# Patient Record
Sex: MALE | Race: WHITE | HISPANIC OR LATINO | Employment: FULL TIME | URBAN - METROPOLITAN AREA
[De-identification: names, ages, dates, MRNs, and addresses within clinical notes are randomized per-mention and may not be internally consistent; named-entity substitution may affect disease eponyms.]

---

## 2018-08-15 ENCOUNTER — OFFICE VISIT (OUTPATIENT)
Dept: URGENT CARE | Facility: PHYSICIAN GROUP | Age: 28
End: 2018-08-15
Payer: COMMERCIAL

## 2018-08-15 VITALS
RESPIRATION RATE: 16 BRPM | BODY MASS INDEX: 34.53 KG/M2 | OXYGEN SATURATION: 94 % | DIASTOLIC BLOOD PRESSURE: 86 MMHG | WEIGHT: 220 LBS | HEIGHT: 67 IN | TEMPERATURE: 99.3 F | HEART RATE: 90 BPM | SYSTOLIC BLOOD PRESSURE: 130 MMHG

## 2018-08-15 DIAGNOSIS — J40 BRONCHITIS: ICD-10-CM

## 2018-08-15 PROCEDURE — 99214 OFFICE O/P EST MOD 30 MIN: CPT | Performed by: PHYSICIAN ASSISTANT

## 2018-08-15 RX ORDER — ALBUTEROL SULFATE 2.5 MG/3ML
2.5 SOLUTION RESPIRATORY (INHALATION) ONCE
Status: COMPLETED | OUTPATIENT
Start: 2018-08-15 | End: 2018-08-15

## 2018-08-15 RX ORDER — DEXTROMETHORPHAN HYDROBROMIDE AND PROMETHAZINE HYDROCHLORIDE 15; 6.25 MG/5ML; MG/5ML
SYRUP ORAL
Qty: 180 ML | Refills: 0 | Status: SHIPPED | OUTPATIENT
Start: 2018-08-15 | End: 2018-10-19

## 2018-08-15 RX ORDER — PREDNISONE 20 MG/1
TABLET ORAL
Qty: 10 TAB | Refills: 0 | Status: SHIPPED | OUTPATIENT
Start: 2018-08-15 | End: 2018-10-19

## 2018-08-15 RX ORDER — DOXYCYCLINE HYCLATE 100 MG
100 TABLET ORAL 2 TIMES DAILY
Qty: 14 TAB | Refills: 0 | Status: SHIPPED | OUTPATIENT
Start: 2018-08-15 | End: 2018-08-22

## 2018-08-15 RX ADMIN — ALBUTEROL SULFATE 2.5 MG: 2.5 SOLUTION RESPIRATORY (INHALATION) at 15:48

## 2018-08-15 ASSESSMENT — ENCOUNTER SYMPTOMS
WEIGHT LOSS: 0
WHEEZING: 0
COUGH: 1
HEMOPTYSIS: 0
SORE THROAT: 0
CHILLS: 0
RHINORRHEA: 1
HEARTBURN: 0
HEADACHES: 0
FEVER: 0
SHORTNESS OF BREATH: 1

## 2018-08-15 ASSESSMENT — COPD QUESTIONNAIRES: COPD: 0

## 2018-08-15 NOTE — PROGRESS NOTES
"Subjective:      Jevon Carbajal is a 27 y.o. male who presents with Cough (x 6 weeks)            Cough   This is a new problem. The current episode started more than 1 month ago. The problem has been gradually worsening. The problem occurs every few minutes. The cough is productive of sputum. Associated symptoms include nasal congestion, postnasal drip, rhinorrhea and shortness of breath. Pertinent negatives include no chest pain, chills, ear congestion, ear pain, fever, headaches, heartburn, hemoptysis, sore throat, weight loss or wheezing. The symptoms are aggravated by lying down. He has tried OTC cough suppressant for the symptoms. The treatment provided no relief. There is no history of asthma or COPD.     Past medical history: Is not pertinent to this examination  Past surgical history: Not pertinent to this examination  Family history: Is not pertinent to this examination  Allergies: No known drug allergies  Social history: Is reviewed in Epic      Review of Systems   Constitutional: Negative for chills, fever and weight loss.   HENT: Positive for postnasal drip and rhinorrhea. Negative for ear pain and sore throat.    Respiratory: Positive for cough and shortness of breath. Negative for hemoptysis and wheezing.    Cardiovascular: Negative for chest pain.   Gastrointestinal: Negative for heartburn.   Neurological: Negative for headaches.          Objective:     /86   Pulse 90   Temp 37.4 °C (99.3 °F)   Resp 16   Ht 1.702 m (5' 7\")   Wt 99.8 kg (220 lb)   SpO2 94%   BMI 34.46 kg/m²      Physical Exam       Gen.: Patient is A and O ×3, no acute distress, well-nourished well-hydrated  Vitals: Are listed and unremarkable  HEENT: Heads normocephalic, atraumatic, PERRLA, extraocular movements intact, TMs and oropharynx clear  Neck: Soft supple without cervical lymphadenopathy  Cardiovascular: Regular rate and rhythm normal S1 and S2. No murmurs, rubs or gallops  Lungs decreased breath sounds bilaterally. " No wheezes rales or rhonchi heard  Abdomen is soft, nontender, nondistended with good bowel sounds, no hepatosplenomegaly  Skin: Is well perfused without evidence of rash or lesions  Neurological:  cranial nerves II through XII were assessed and intact.  Musculoskeletal: Full range of motion, 5 out of 5 strength against resistance  Neurovascularly: Intact with a 2 second cap refill, good distal pulses    Urgent care course: Albuterol breathing treatment ×1 given. Patient may auscultated had much improved breath sounds that were clear  Assessment/Plan:     1. Bronchitis  Given duration of symptoms, I will put him on an antibiotic. Please follow-up with us if symptoms persist or worsen despite treatment  - doxycycline (VIBRAMYCIN) 100 MG Tab; Take 1 Tab by mouth 2 times a day for 7 days.  Dispense: 14 Tab; Refill: 0  - albuterol (PROVENTIL) 2.5mg/3ml nebulizer solution 2.5 mg; 3 mL by Nebulization route Once.  - predniSONE (DELTASONE) 20 MG Tab; Take one pill twice a day for five days  Dispense: 10 Tab; Refill: 0  - promethazine-dextromethorphan (PROMETHAZINE-DM) 6.25-15 MG/5ML syrup; Take 5mls every six hours as needed for cough  Dispense: 180 mL; Refill: 0

## 2018-10-19 ENCOUNTER — HOSPITAL ENCOUNTER (OUTPATIENT)
Facility: MEDICAL CENTER | Age: 28
End: 2018-10-19
Attending: NURSE PRACTITIONER
Payer: COMMERCIAL

## 2018-10-19 ENCOUNTER — OFFICE VISIT (OUTPATIENT)
Dept: URGENT CARE | Facility: PHYSICIAN GROUP | Age: 28
End: 2018-10-19
Payer: COMMERCIAL

## 2018-10-19 ENCOUNTER — HOSPITAL ENCOUNTER (OUTPATIENT)
Dept: RADIOLOGY | Facility: MEDICAL CENTER | Age: 28
End: 2018-10-19
Attending: NURSE PRACTITIONER
Payer: COMMERCIAL

## 2018-10-19 ENCOUNTER — HOSPITAL ENCOUNTER (OUTPATIENT)
Facility: MEDICAL CENTER | Age: 28
End: 2018-10-20
Attending: EMERGENCY MEDICINE | Admitting: SURGERY
Payer: COMMERCIAL

## 2018-10-19 VITALS
HEIGHT: 67 IN | OXYGEN SATURATION: 99 % | WEIGHT: 214 LBS | RESPIRATION RATE: 14 BRPM | HEART RATE: 95 BPM | BODY MASS INDEX: 33.59 KG/M2 | DIASTOLIC BLOOD PRESSURE: 80 MMHG | SYSTOLIC BLOOD PRESSURE: 128 MMHG | TEMPERATURE: 97.7 F

## 2018-10-19 DIAGNOSIS — G89.18 POST-OP PAIN: ICD-10-CM

## 2018-10-19 DIAGNOSIS — K35.80 ACUTE APPENDICITIS, UNSPECIFIED ACUTE APPENDICITIS TYPE: ICD-10-CM

## 2018-10-19 DIAGNOSIS — N13.30 HYDRONEPHROSIS OF RIGHT KIDNEY: ICD-10-CM

## 2018-10-19 DIAGNOSIS — R10.9 ACUTE RIGHT FLANK PAIN: ICD-10-CM

## 2018-10-19 DIAGNOSIS — K35.30 ACUTE APPENDICITIS WITH LOCALIZED PERITONITIS, UNSPECIFIED WHETHER ABSCESS PRESENT, UNSPECIFIED WHETHER GANGRENE PRESENT, UNSPECIFIED WHETHER PERFORATION PRESENT: ICD-10-CM

## 2018-10-19 LAB
ALBUMIN SERPL BCP-MCNC: 4.5 G/DL (ref 3.2–4.9)
ALBUMIN SERPL BCP-MCNC: 4.5 G/DL (ref 3.2–4.9)
ALBUMIN/GLOB SERPL: 1.2 G/DL
ALBUMIN/GLOB SERPL: 1.2 G/DL
ALP SERPL-CCNC: 95 U/L (ref 30–99)
ALP SERPL-CCNC: 97 U/L (ref 30–99)
ALT SERPL-CCNC: 29 U/L (ref 2–50)
ALT SERPL-CCNC: 33 U/L (ref 2–50)
ANION GAP SERPL CALC-SCNC: 10 MMOL/L (ref 0–11.9)
ANION GAP SERPL CALC-SCNC: 10 MMOL/L (ref 0–11.9)
APPEARANCE UR: CLEAR
APPEARANCE UR: CLEAR
AST SERPL-CCNC: 23 U/L (ref 12–45)
AST SERPL-CCNC: 23 U/L (ref 12–45)
BACTERIA #/AREA URNS HPF: NEGATIVE /HPF
BASOPHILS # BLD AUTO: 0.3 % (ref 0–1.8)
BASOPHILS # BLD AUTO: 0.4 % (ref 0–1.8)
BASOPHILS # BLD: 0.05 K/UL (ref 0–0.12)
BASOPHILS # BLD: 0.06 K/UL (ref 0–0.12)
BILIRUB SERPL-MCNC: 0.4 MG/DL (ref 0.1–1.5)
BILIRUB SERPL-MCNC: 0.5 MG/DL (ref 0.1–1.5)
BILIRUB UR QL STRIP.AUTO: NEGATIVE
BILIRUB UR STRIP-MCNC: NEGATIVE MG/DL
BUN SERPL-MCNC: 16 MG/DL (ref 8–22)
BUN SERPL-MCNC: 17 MG/DL (ref 8–22)
CALCIUM SERPL-MCNC: 10 MG/DL (ref 8.5–10.5)
CALCIUM SERPL-MCNC: 9.9 MG/DL (ref 8.5–10.5)
CHLORIDE SERPL-SCNC: 102 MMOL/L (ref 96–112)
CHLORIDE SERPL-SCNC: 102 MMOL/L (ref 96–112)
CO2 SERPL-SCNC: 25 MMOL/L (ref 20–33)
CO2 SERPL-SCNC: 26 MMOL/L (ref 20–33)
COLOR UR AUTO: YELLOW
COLOR UR: YELLOW
CREAT SERPL-MCNC: 0.95 MG/DL (ref 0.5–1.4)
CREAT SERPL-MCNC: 0.97 MG/DL (ref 0.5–1.4)
EOSINOPHIL # BLD AUTO: 0.74 K/UL (ref 0–0.51)
EOSINOPHIL # BLD AUTO: 1.17 K/UL (ref 0–0.51)
EOSINOPHIL NFR BLD: 4.9 % (ref 0–6.9)
EOSINOPHIL NFR BLD: 7.2 % (ref 0–6.9)
EPI CELLS #/AREA URNS HPF: NEGATIVE /HPF
ERYTHROCYTE [DISTWIDTH] IN BLOOD BY AUTOMATED COUNT: 44.1 FL (ref 35.9–50)
ERYTHROCYTE [DISTWIDTH] IN BLOOD BY AUTOMATED COUNT: 44.1 FL (ref 35.9–50)
GLOBULIN SER CALC-MCNC: 3.8 G/DL (ref 1.9–3.5)
GLOBULIN SER CALC-MCNC: 3.9 G/DL (ref 1.9–3.5)
GLUCOSE SERPL-MCNC: 102 MG/DL (ref 65–99)
GLUCOSE SERPL-MCNC: 83 MG/DL (ref 65–99)
GLUCOSE UR STRIP.AUTO-MCNC: NEGATIVE MG/DL
GLUCOSE UR STRIP.AUTO-MCNC: NEGATIVE MG/DL
HCT VFR BLD AUTO: 45.1 % (ref 42–52)
HCT VFR BLD AUTO: 45.7 % (ref 42–52)
HGB BLD-MCNC: 14.9 G/DL (ref 14–18)
HGB BLD-MCNC: 15.1 G/DL (ref 14–18)
HYALINE CASTS #/AREA URNS LPF: ABNORMAL /LPF
IMM GRANULOCYTES # BLD AUTO: 0.05 K/UL (ref 0–0.11)
IMM GRANULOCYTES # BLD AUTO: 0.06 K/UL (ref 0–0.11)
IMM GRANULOCYTES NFR BLD AUTO: 0.3 % (ref 0–0.9)
IMM GRANULOCYTES NFR BLD AUTO: 0.4 % (ref 0–0.9)
KETONES UR STRIP.AUTO-MCNC: NEGATIVE MG/DL
KETONES UR STRIP.AUTO-MCNC: NEGATIVE MG/DL
LEUKOCYTE ESTERASE UR QL STRIP.AUTO: NEGATIVE
LEUKOCYTE ESTERASE UR QL STRIP.AUTO: NEGATIVE
LIPASE SERPL-CCNC: 30 U/L (ref 11–82)
LYMPHOCYTES # BLD AUTO: 1.64 K/UL (ref 1–4.8)
LYMPHOCYTES # BLD AUTO: 2.49 K/UL (ref 1–4.8)
LYMPHOCYTES NFR BLD: 10.8 % (ref 22–41)
LYMPHOCYTES NFR BLD: 15.3 % (ref 22–41)
MCH RBC QN AUTO: 28 PG (ref 27–33)
MCH RBC QN AUTO: 28.1 PG (ref 27–33)
MCHC RBC AUTO-ENTMCNC: 33 G/DL (ref 33.7–35.3)
MCHC RBC AUTO-ENTMCNC: 33 G/DL (ref 33.7–35.3)
MCV RBC AUTO: 84.8 FL (ref 81.4–97.8)
MCV RBC AUTO: 85.1 FL (ref 81.4–97.8)
MICRO URNS: ABNORMAL
MONOCYTES # BLD AUTO: 1.01 K/UL (ref 0–0.85)
MONOCYTES # BLD AUTO: 1.21 K/UL (ref 0–0.85)
MONOCYTES NFR BLD AUTO: 6.6 % (ref 0–13.4)
MONOCYTES NFR BLD AUTO: 7.4 % (ref 0–13.4)
NEUTROPHILS # BLD AUTO: 11.31 K/UL (ref 1.82–7.42)
NEUTROPHILS # BLD AUTO: 11.7 K/UL (ref 1.82–7.42)
NEUTROPHILS NFR BLD: 69.3 % (ref 44–72)
NEUTROPHILS NFR BLD: 77.1 % (ref 44–72)
NITRITE UR QL STRIP.AUTO: NEGATIVE
NITRITE UR QL STRIP.AUTO: NEGATIVE
NRBC # BLD AUTO: 0 K/UL
NRBC # BLD AUTO: 0 K/UL
NRBC BLD-RTO: 0 /100 WBC
NRBC BLD-RTO: 0 /100 WBC
PH UR STRIP.AUTO: 6 [PH]
PH UR STRIP.AUTO: 6.5 [PH] (ref 5–8)
PLATELET # BLD AUTO: 136 K/UL (ref 164–446)
PLATELET # BLD AUTO: 160 K/UL (ref 164–446)
PMV BLD AUTO: 12.1 FL (ref 9–12.9)
PMV BLD AUTO: 13.6 FL (ref 9–12.9)
POTASSIUM SERPL-SCNC: 3.6 MMOL/L (ref 3.6–5.5)
POTASSIUM SERPL-SCNC: 3.8 MMOL/L (ref 3.6–5.5)
PROT SERPL-MCNC: 8.3 G/DL (ref 6–8.2)
PROT SERPL-MCNC: 8.4 G/DL (ref 6–8.2)
PROT UR QL STRIP: NEGATIVE MG/DL
PROT UR QL STRIP: NEGATIVE MG/DL
RBC # BLD AUTO: 5.32 M/UL (ref 4.7–6.1)
RBC # BLD AUTO: 5.37 M/UL (ref 4.7–6.1)
RBC # URNS HPF: ABNORMAL /HPF
RBC UR QL AUTO: ABNORMAL
RBC UR QL AUTO: NORMAL
SODIUM SERPL-SCNC: 137 MMOL/L (ref 135–145)
SODIUM SERPL-SCNC: 138 MMOL/L (ref 135–145)
SP GR UR STRIP.AUTO: 1.01
SP GR UR STRIP.AUTO: 1.01
UROBILINOGEN UR STRIP-MCNC: 0.2 MG/DL
UROBILINOGEN UR STRIP.AUTO-MCNC: 0.2 MG/DL
WBC # BLD AUTO: 15.2 K/UL (ref 4.8–10.8)
WBC # BLD AUTO: 16.3 K/UL (ref 4.8–10.8)
WBC #/AREA URNS HPF: ABNORMAL /HPF

## 2018-10-19 PROCEDURE — 85610 PROTHROMBIN TIME: CPT

## 2018-10-19 PROCEDURE — 96365 THER/PROPH/DIAG IV INF INIT: CPT

## 2018-10-19 PROCEDURE — 36415 COLL VENOUS BLD VENIPUNCTURE: CPT

## 2018-10-19 PROCEDURE — 700105 HCHG RX REV CODE 258: Performed by: EMERGENCY MEDICINE

## 2018-10-19 PROCEDURE — 99285 EMERGENCY DEPT VISIT HI MDM: CPT

## 2018-10-19 PROCEDURE — 81001 URINALYSIS AUTO W/SCOPE: CPT

## 2018-10-19 PROCEDURE — 74176 CT ABD & PELVIS W/O CONTRAST: CPT

## 2018-10-19 PROCEDURE — 99214 OFFICE O/P EST MOD 30 MIN: CPT | Performed by: NURSE PRACTITIONER

## 2018-10-19 PROCEDURE — 85730 THROMBOPLASTIN TIME PARTIAL: CPT

## 2018-10-19 PROCEDURE — 76775 US EXAM ABDO BACK WALL LIM: CPT

## 2018-10-19 PROCEDURE — 83690 ASSAY OF LIPASE: CPT

## 2018-10-19 PROCEDURE — 81002 URINALYSIS NONAUTO W/O SCOPE: CPT | Performed by: NURSE PRACTITIONER

## 2018-10-19 PROCEDURE — 85025 COMPLETE CBC W/AUTO DIFF WBC: CPT | Mod: 91

## 2018-10-19 PROCEDURE — 85025 COMPLETE CBC W/AUTO DIFF WBC: CPT

## 2018-10-19 PROCEDURE — 80053 COMPREHEN METABOLIC PANEL: CPT

## 2018-10-19 PROCEDURE — 80053 COMPREHEN METABOLIC PANEL: CPT | Mod: 91

## 2018-10-19 PROCEDURE — G0378 HOSPITAL OBSERVATION PER HR: HCPCS

## 2018-10-19 PROCEDURE — 700111 HCHG RX REV CODE 636 W/ 250 OVERRIDE (IP): Performed by: EMERGENCY MEDICINE

## 2018-10-19 RX ORDER — SODIUM CHLORIDE 9 MG/ML
INJECTION, SOLUTION INTRAVENOUS CONTINUOUS
Status: DISCONTINUED | OUTPATIENT
Start: 2018-10-19 | End: 2018-10-20

## 2018-10-19 RX ADMIN — PIPERACILLIN AND TAZOBACTAM 4.5 G: 4; .5 INJECTION, POWDER, LYOPHILIZED, FOR SOLUTION INTRAVENOUS; PARENTERAL at 23:15

## 2018-10-19 RX ADMIN — SODIUM CHLORIDE: 9 INJECTION, SOLUTION INTRAVENOUS at 23:19

## 2018-10-19 ASSESSMENT — PAIN SCALES - GENERAL
PAINLEVEL_OUTOF10: 8
PAINLEVEL_OUTOF10: 2

## 2018-10-19 NOTE — PROGRESS NOTES
"Chief Complaint   Patient presents with   • RLQ Pain     r side pain since yesterday morning        HISTORY OF PRESENT ILLNESS: Patient is a 28 y.o. male who presents to urgent care today with complaints of right sided abdominal pain for over 24 hours. The pain is sharp, right sided and radiates to flank, constant, worsened with positional changes. He denies fever, chills, malaise, diarrhea, cough, shortness of breath, or urinary changes. He has not taken anything for pain relief.       There are no active problems to display for this patient.      Allergies:Patient has no known allergies.    No current [facility]-ordered outpatient prescriptions on file.     No current [facility]-ordered facility-administered medications on file.        History reviewed. No pertinent past medical history.    Social History   Substance Use Topics   • Smoking status: Former Smoker   • Smokeless tobacco: Never Used   • Alcohol use No       No family status information on file.   History reviewed. No pertinent family history.    ROS:  Review of Systems   Constitutional: Negative for fever, chills, weight loss, malaise, and fatigue.   HENT: Negative for ear pain, nosebleeds, congestion, sore throat and neck pain.    Eyes: Negative for vision changes.   Neuro: Negative for headache, sensory changes, weakness, seizure, LOC.   Cardiovascular: Negative for chest pain, palpitations, orthopnea and leg swelling.   Respiratory: Negative for cough, sputum production, shortness of breath and wheezing.   Gastrointestinal: Positive for abdominal pain. Negative for nausea, vomiting or diarrhea.   Genitourinary: Negative for dysuria, urgency and frequency.  Musculoskeletal: Negative for falls, neck pain, back pain, joint pain, myalgias.   Skin: Negative for rash, diaphoresis.     Exam:  Blood pressure 128/80, pulse 95, temperature 36.5 °C (97.7 °F), temperature source Temporal, resp. rate 14, height 1.702 m (5' 7\"), weight 97.1 kg (214 lb), SpO2 99 %.  General: " "well-nourished, well-developed male in NAD  Head: normocephalic, atraumatic  Eyes: PERRLA, no conjunctival injection, acuity grossly intact, lids normal.  Ears: normal shape and symmetry, no tenderness, no discharge. External canals are without any significant edema or erythema. Tympanic membranes are without any inflammation, no effusion. Gross auditory acuity is intact.  Nose: symmetrical without tenderness, no discharge.  Mouth/Throat: reasonable hygiene, no erythema, exudates or tonsillar enlargement.  Neck: no masses, range of motion within normal limits, no tracheal deviation. No obvious thyroid enlargement.   Lymph: no cervical adenopathy. No supraclavicular adenopathy.   Neuro: alert and oriented. Cranial nerves 1-12 grossly intact. No sensory deficit.   Cardiovascular: regular rate and rhythm. No edema.  Pulmonary: no distress. Chest is symmetrical with respiration, no wheezes, crackles, or rhonchi.   Abdomen: soft, right sided mid abdominal tenderness, no guarding, no hepatosplenomegaly.  Musculoskeletal: no clubbing, appropriate muscle tone, gait is stable.  Skin: warm, dry, intact, no clubbing, no cyanosis, no rashes.   Psych: appropriate mood, affect, judgement.         Assessment/Plan:  1. Acute appendicitis, unspecified acute appendicitis type  US-RENAL    POCT Urinalysis    CBC WITH DIFFERENTIAL    COMP METABOLIC PANEL    CT-RENAL COLIC EVALUATION(A/P W/O)   2. Hydronephrosis of right kidney  CBC WITH DIFFERENTIAL    COMP METABOLIC PANEL    CT-RENAL COLIC EVALUATION(A/P W/O)         Renal U/S radiology reading \"1. There is mild right hydronephrosis. No evidence of left hydronephrosis. 2. Small post void residual noted in the urinary bladder.\"    CT renal colic radiology reading \"1.  Acute appendicitis in the right lower quadrant. No evidence of rupture or abscess. 2.  Mild right hydronephrosis without evidence of nephrolithiasis or ureterolithiasis.\"        POC urine does show blood, US ordered which " noted mild right hydronephrosis. CT renal colic, CBC, and CMP ordered.   CT reading shows incidental finding of appendicitis. At this time, the patient is sent to the ER for surgical consult and intervention. This has been discussed with the patient and he states agreement and understanding. The patient would like to go to [facility] ED, the ERP ([facility]) has been contacted regarding patient and will be anticipating patient's arrival. The patient is stable to leave POV at this time and will go directly to ED without delay. He will remain NPO.         Please note that this dictation was created using voice recognition software. I have made every reasonable attempt to correct obvious errors, but I expect that there are errors of grammar and possibly content that I did not discover before finalizing the note.      LEXI Diehl.

## 2018-10-20 VITALS
OXYGEN SATURATION: 94 % | TEMPERATURE: 98.1 F | WEIGHT: 209 LBS | SYSTOLIC BLOOD PRESSURE: 107 MMHG | HEART RATE: 89 BPM | DIASTOLIC BLOOD PRESSURE: 52 MMHG | BODY MASS INDEX: 32.73 KG/M2 | RESPIRATION RATE: 16 BRPM

## 2018-10-20 LAB
APTT PPP: 34.7 SEC (ref 24.7–36)
INR PPP: 1.11 (ref 0.87–1.13)
PROTHROMBIN TIME: 14.4 SEC (ref 12–14.6)

## 2018-10-20 PROCEDURE — 88304 TISSUE EXAM BY PATHOLOGIST: CPT

## 2018-10-20 PROCEDURE — 700111 HCHG RX REV CODE 636 W/ 250 OVERRIDE (IP)

## 2018-10-20 PROCEDURE — 700101 HCHG RX REV CODE 250

## 2018-10-20 PROCEDURE — 96375 TX/PRO/DX INJ NEW DRUG ADDON: CPT

## 2018-10-20 PROCEDURE — 160048 HCHG OR STATISTICAL LEVEL 1-5: Performed by: SURGERY

## 2018-10-20 PROCEDURE — 501571 HCHG TROCAR, SEPARATOR 12X100: Performed by: SURGERY

## 2018-10-20 PROCEDURE — 160009 HCHG ANES TIME/MIN: Performed by: SURGERY

## 2018-10-20 PROCEDURE — G0378 HOSPITAL OBSERVATION PER HR: HCPCS

## 2018-10-20 PROCEDURE — 700102 HCHG RX REV CODE 250 W/ 637 OVERRIDE(OP): Performed by: SURGERY

## 2018-10-20 PROCEDURE — 501570 HCHG TROCAR, SEPARATOR: Performed by: SURGERY

## 2018-10-20 PROCEDURE — 501399 HCHG SPECIMAN BAG, ENDO CATC: Performed by: SURGERY

## 2018-10-20 PROCEDURE — 700105 HCHG RX REV CODE 258: Performed by: EMERGENCY MEDICINE

## 2018-10-20 PROCEDURE — 501838 HCHG SUTURE GENERAL: Performed by: SURGERY

## 2018-10-20 PROCEDURE — 160039 HCHG SURGERY MINUTES - EA ADDL 1 MIN LEVEL 3: Performed by: SURGERY

## 2018-10-20 PROCEDURE — 502571 HCHG PACK, LAP CHOLE: Performed by: SURGERY

## 2018-10-20 PROCEDURE — 500002 HCHG ADHESIVE, DERMABOND: Performed by: SURGERY

## 2018-10-20 PROCEDURE — 160035 HCHG PACU - 1ST 60 MINS PHASE I: Performed by: SURGERY

## 2018-10-20 PROCEDURE — 160002 HCHG RECOVERY MINUTES (STAT): Performed by: SURGERY

## 2018-10-20 PROCEDURE — A9270 NON-COVERED ITEM OR SERVICE: HCPCS | Performed by: SURGERY

## 2018-10-20 PROCEDURE — 501583 HCHG TROCAR, THRD CAN&SEAL 5X100: Performed by: SURGERY

## 2018-10-20 PROCEDURE — 160028 HCHG SURGERY MINUTES - 1ST 30 MINS LEVEL 3: Performed by: SURGERY

## 2018-10-20 RX ORDER — MORPHINE SULFATE 10 MG/ML
5 INJECTION, SOLUTION INTRAMUSCULAR; INTRAVENOUS
Status: DISCONTINUED | OUTPATIENT
Start: 2018-10-20 | End: 2018-10-20

## 2018-10-20 RX ORDER — BUPIVACAINE HYDROCHLORIDE AND EPINEPHRINE 5; 5 MG/ML; UG/ML
INJECTION, SOLUTION EPIDURAL; INTRACAUDAL; PERINEURAL
Status: DISCONTINUED | OUTPATIENT
Start: 2018-10-20 | End: 2018-10-20 | Stop reason: HOSPADM

## 2018-10-20 RX ORDER — OXYCODONE HYDROCHLORIDE AND ACETAMINOPHEN 5; 325 MG/1; MG/1
1-2 TABLET ORAL EVERY 4 HOURS PRN
Qty: 30 TAB | Refills: 0 | Status: SHIPPED | OUTPATIENT
Start: 2018-10-20 | End: 2018-10-27

## 2018-10-20 RX ORDER — ONDANSETRON 2 MG/ML
4 INJECTION INTRAMUSCULAR; INTRAVENOUS EVERY 6 HOURS PRN
Status: DISCONTINUED | OUTPATIENT
Start: 2018-10-20 | End: 2018-10-20

## 2018-10-20 RX ORDER — MIDAZOLAM HYDROCHLORIDE 1 MG/ML
1 INJECTION INTRAMUSCULAR; INTRAVENOUS
Status: DISCONTINUED | OUTPATIENT
Start: 2018-10-20 | End: 2018-10-20 | Stop reason: HOSPADM

## 2018-10-20 RX ORDER — ONDANSETRON 2 MG/ML
4 INJECTION INTRAMUSCULAR; INTRAVENOUS EVERY 4 HOURS PRN
Status: DISCONTINUED | OUTPATIENT
Start: 2018-10-20 | End: 2018-10-20 | Stop reason: HOSPADM

## 2018-10-20 RX ORDER — SODIUM CHLORIDE, SODIUM LACTATE, POTASSIUM CHLORIDE, CALCIUM CHLORIDE 600; 310; 30; 20 MG/100ML; MG/100ML; MG/100ML; MG/100ML
INJECTION, SOLUTION INTRAVENOUS CONTINUOUS
Status: DISCONTINUED | OUTPATIENT
Start: 2018-10-20 | End: 2018-10-20 | Stop reason: HOSPADM

## 2018-10-20 RX ORDER — MORPHINE SULFATE 4 MG/ML
2 INJECTION, SOLUTION INTRAMUSCULAR; INTRAVENOUS
Status: DISCONTINUED | OUTPATIENT
Start: 2018-10-20 | End: 2018-10-20 | Stop reason: HOSPADM

## 2018-10-20 RX ORDER — HALOPERIDOL 5 MG/ML
1 INJECTION INTRAMUSCULAR
Status: DISCONTINUED | OUTPATIENT
Start: 2018-10-20 | End: 2018-10-20 | Stop reason: HOSPADM

## 2018-10-20 RX ORDER — OXYCODONE HYDROCHLORIDE AND ACETAMINOPHEN 5; 325 MG/1; MG/1
1-2 TABLET ORAL EVERY 4 HOURS PRN
Status: DISCONTINUED | OUTPATIENT
Start: 2018-10-20 | End: 2018-10-20 | Stop reason: HOSPADM

## 2018-10-20 RX ORDER — POLYETHYLENE GLYCOL 3350 17 G/17G
17 POWDER, FOR SOLUTION ORAL PRN
Qty: 1 BOTTLE | Status: SHIPPED | OUTPATIENT
Start: 2018-10-20 | End: 2020-07-14

## 2018-10-20 RX ADMIN — FENTANYL CITRATE 50 MCG: 50 INJECTION, SOLUTION INTRAMUSCULAR; INTRAVENOUS at 09:04

## 2018-10-20 RX ADMIN — SODIUM CHLORIDE: 9 INJECTION, SOLUTION INTRAVENOUS at 02:42

## 2018-10-20 RX ADMIN — OXYCODONE HYDROCHLORIDE AND ACETAMINOPHEN 1 TABLET: 5; 325 TABLET ORAL at 11:32

## 2018-10-20 ASSESSMENT — COGNITIVE AND FUNCTIONAL STATUS - GENERAL
MOBILITY SCORE: 24
SUGGESTED CMS G CODE MODIFIER DAILY ACTIVITY: CH
SUGGESTED CMS G CODE MODIFIER MOBILITY: CH
DAILY ACTIVITIY SCORE: 24

## 2018-10-20 ASSESSMENT — LIFESTYLE VARIABLES: ALCOHOL_USE: NO

## 2018-10-20 ASSESSMENT — PAIN SCALES - GENERAL
PAINLEVEL_OUTOF10: 6
PAINLEVEL_OUTOF10: 3
PAINLEVEL_OUTOF10: 3
PAINLEVEL_OUTOF10: 2
PAINLEVEL_OUTOF10: 3
PAINLEVEL_OUTOF10: 8

## 2018-10-20 ASSESSMENT — PATIENT HEALTH QUESTIONNAIRE - PHQ9
1. LITTLE INTEREST OR PLEASURE IN DOING THINGS: NOT AT ALL
2. FEELING DOWN, DEPRESSED, IRRITABLE, OR HOPELESS: NOT AT ALL
SUM OF ALL RESPONSES TO PHQ9 QUESTIONS 1 AND 2: 0

## 2018-10-20 NOTE — H&P
Surgery General History & Physical Note    Date  10/20/2018    Primary Care Physician  Rubina Blair M.D. (Inactive)    CC  Appendicitis    HPI  This is a 28 y.o. male who presented with RLQ abdominal pain for 2 days.  Pain is constant, will radiate to the mid-abdomen but mostly is localized in the RLQ.  He also has nausea along with the pain.  He has some subjective fevers at home as well and has never had similar problems in the past.      History reviewed. No pertinent past medical history.    History reviewed. No pertinent surgical history.    Current Facility-Administered Medications   Medication Dose Route Frequency Provider Last Rate Last Dose   • [MAR Hold] morphine (pf) 10 mg/ml 10 MG/ML injection 5 mg  5 mg Intravenous Q3HRS PRN Fletcher Campa II, M.D.       • [MAR Hold] ondansetron (ZOFRAN) syringe/vial injection 4 mg  4 mg Intravenous Q6HRS PRN Fletcher Campa II, M.D.       • NS infusion   Intravenous Continuous Fletcher Campa II, M.D. 175 mL/hr at 10/20/18 0242         Social History     Social History   • Marital status: Single     Spouse name: N/A   • Number of children: N/A   • Years of education: N/A     Occupational History   • Not on file.     Social History Main Topics   • Smoking status: Former Smoker   • Smokeless tobacco: Never Used   • Alcohol use No   • Drug use: No   • Sexual activity: No     Other Topics Concern   • Not on file     Social History Narrative   • No narrative on file       No family history on file.    Allergies  Patient has no known allergies.    Review of Systems  Negative except as above    Physical Exam    Vital Signs  Blood Pressure: 102/68   Temperature: 36.2 °C (97.2 °F)   Pulse: 77   Respiration: 18   Pulse Oximetry: 95 %       Labs:  Recent Labs      10/19/18   1645  10/19/18   1957   WBC  16.3*  15.2*   RBC  5.37  5.32   HEMOGLOBIN  15.1  14.9   HEMATOCRIT  45.7  45.1   MCV  85.1  84.8   MCH  28.1  28.0   MCHC  33.0*  33.0*   RDW  44.1  44.1    PLATELETCT  136*  160*   MPV  13.6*  12.1     Recent Labs      10/19/18   1645  10/19/18   1957   SODIUM  137  138   POTASSIUM  3.6  3.8   CHLORIDE  102  102   CO2  25  26   GLUCOSE  83  102*   BUN  17  16   CREATININE  0.95  0.97   CALCIUM  9.9  10.0     Recent Labs      10/19/18   2353   APTT  34.7   INR  1.11     Recent Labs      10/19/18   1645  10/19/18   1957  10/19/18   2353   ASTSGOT  23  23   --    ALTSGPT  29  33   --    TBILIRUBIN  0.4  0.5   --    ALKPHOSPHAT  95  97   --    GLOBULIN  3.8*  3.9*   --    INR   --    --   1.11       Radiology:  No orders to display   CT: 1.  Acute appendicitis in the right lower quadrant. No evidence of rupture or abscess.    2.  Mild right hydronephrosis without evidence of nephrolithiasis or ureterolithiasis.      Assessment/Plan:  28y M here with appendicitis, to OR for Lap Appy, pt is NPO and consents to planned surgery  Procedure(s):  APPENDECTOMY LAPAROSCOPIC

## 2018-10-20 NOTE — PROGRESS NOTES
Patient AAOx4, denies pain, no n/v, requesting food. No distress, family updated on room number and approximate time of arrival to room.

## 2018-10-20 NOTE — ED NOTES
Pt sent from [facility] urgent care as transfer by pov with acute appendicitis. Pt denies current abd pain or nausea. Pt anxious, but otherwise in nad.

## 2018-10-20 NOTE — DISCHARGE INSTRUCTIONS
Discharge Instructions    Discharged to home by car with relative. Discharged via wheelchair, hospital escort: Yes.  Special equipment needed: Not Applicable    Be sure to schedule a follow-up appointment with your primary care doctor or any specialists as instructed.     Discharge Plan:   Diet Plan: Discussed  Activity Level: Discussed  Confirmed Follow up Appointment: Patient to Call and Schedule Appointment  Confirmed Symptoms Management: Discussed  Medication Reconciliation Updated: Yes  Influenza Vaccine Indication: Patient Refuses    I understand that a diet low in cholesterol, fat, and sodium is recommended for good health. Unless I have been given specific instructions below for another diet, I accept this instruction as my diet prescription.   Other diet: Regular diet as tolerated  Special Instructions:   Monitor for signs and symptoms of infection (fever, chills, nausea, vomiting)  Monitor incisions for swelling, redness, or drainage  No heavy lifting >10lbs for 4 weeks  If you need a doctor's note for work, call Dr. Allen's office [phone]    Laparoscopic Appendectomy, Adult  A laparoscopic appendectomy is a surgery to take out the appendix. The appendix is a finger-like structure that is attached to the large intestine. In this surgery, the appendix is removed through two or three small cuts (incisions) with the help of a thin, lighted tube that has a tiny camera on the end (laparoscope).  A laparoscopic appendectomy may be done to prevent an inflamed appendix from bursting (rupturing). Or, it may be done to treat the infection from an appendix that has already ruptured. It is usually done immediately after inflammation of the appendix (appendicitis) is diagnosed.  Tell a health care provider about:  · Any allergies you have.  · All medicines you are taking, including vitamins, herbs, eye drops, creams, and over-the-counter medicines.  · Any problems you or family members have had with anesthetic  medicines.  · Any blood disorders you have.  · Any surgeries you have had.  · Any medical conditions you have.  · Whether you are pregnant or may be pregnant.  What are the risks?  Generally, this is a safe procedure. However, problems may occur, including:  · Infection.  · Bleeding.  · Allergic reactions to medicines.  · Damage to other structures or organs.  · The formation of collections of pus (abscesses).  · Long-lasting pain or scarring at the incision sites or inside the abdomen.  · Blood clots in the legs.  What happens before the procedure?  · Follow instructions from your health care provider about eating or drinking restrictions.  · Ask your health care provider about:  ¨ Changing or stopping your regular medicines. This is especially important if you are taking diabetes medicines or blood thinners.  ¨ Taking medicines such as aspirin and ibuprofen. These medicines can thin your blood. Do not take these medicines before your procedure if your health care provider instructs you not to.  · Ask your health care provider how your surgical site will be marked or identified.  · You may be given antibiotic medicine to help prevent infection or to treat existing inflammation or infection.  · If you will be going home on the same day as your surgery, plan to have someone with you for 24 hours.  What happens during the procedure?  · To reduce your risk of infection:  ¨ Your health care team will wash or sanitize their hands.  ¨ Your skin will be washed with soap.  · An IV tube will be inserted into one of your veins. You will receive medicine and fluids through this tube.  · You will be given one or more of the following:  ¨ A medicine to help you relax (sedative).  ¨ A medicine to numb the area (local anesthetic).  ¨ A medicine to make you fall asleep (general anesthetic).  ¨ A medicine that is injected into your spine to numb the area below and slightly above the injection site (spinal anesthetic).  ¨ A medicine  that is injected into an area of your body to numb everything below the injection site (regional anesthetic).  · A thin, flexible tube (catheter) may be put into your bladder to drain urine.  · A tube may be passed through your nose and into your stomach (NG tube, or nasogastric tube) to drain any stomach contents.  · Your surgeon will make two or three small incisions near your belly button (navel).  · Air-like gas will be used to fill your abdomen. The gas will make your abdomen expand. This helps the surgeon to see clearly and gives him or her more room to work.  · A laparoscope will be passed through one of the incisions.  · Other long, thin surgical instruments will be passed through the other incisions.  · The appendix will be located and removed through one of the incisions.  · The abdomen may be washed out to remove bacteria.  · The incisions will be closed with stitches (sutures), staples, or adhesive strips.  · A bandage (dressing) may be used to cover the incisions.  · If a tube was inserted into your bladder or stomach, it will be removed.  What happens after the procedure?  · Your blood pressure, heart rate, breathing rate, and blood oxygen level will be monitored often until the medicines you were given have worn off.  · You will be given pain medicine as needed to keep you comfortable.  · If your appendix did not rupture, you may be able to go home the same day as your surgery.  · Do not drive for 24 hours if you received a sedative.  · If your appendix ruptured:  ¨ You will get antibiotic medicine through an IV tube.  ¨ You may be sent home with a temporary drain.  This information is not intended to replace advice given to you by your health care provider. Make sure you discuss any questions you have with your health care provider.  Document Released: 08/01/2005 Document Revised: 05/25/2017 Document Reviewed: 06/06/2016  Elsevier Interactive Patient Education © 2017 [employer]vier Inc.    Laparoscopic  Appendectomy, Adult, Care After  Refer to this sheet in the next few weeks. These instructions provide you with information on caring for yourself after your procedure. Your caregiver may also give you more specific instructions. Your treatment has been planned according to current medical practices, but problems sometimes occur. Call your caregiver if you have any problems or questions after your procedure.  HOME CARE INSTRUCTIONS  · Do not drive while taking narcotic pain medicines.  · Use stool softener if you become constipated from your pain medicines.  · Change your bandages (dressings) as directed.  · Keep your wounds clean and dry. You may wash the wounds gently with soap and water. Gently pat the wounds dry with a clean towel.  · Do not take baths, swim, or use hot tubs for 10 days, or as instructed by your caregiver.  · Only take over-the-counter or prescription medicines for pain, discomfort, or fever as directed by your caregiver.  · You may continue your normal diet as directed.  · Do not lift more than 10 pounds (4.5 kg) or play contact sports for 3 weeks, or as directed.  · Slowly increase your activity after surgery.  · Take deep breaths to avoid getting a lung infection (pneumonia).  SEEK MEDICAL CARE IF:  · You have redness, swelling, or increasing pain in your wounds.  · You have pus coming from your wounds.  · You have drainage from a wound that lasts longer than 1 day.  · You notice a bad smell coming from the wounds or dressing.  · Your wound edges break open after stitches (sutures) have been removed.  · You notice increasing pain in the shoulders (shoulder strap areas) or near your shoulder blades.  · You develop dizzy episodes or fainting while standing.  · You develop shortness of breath.  · You develop persistent nausea or vomiting.  · You cannot control your bowel functions or lose your appetite.  · You develop diarrhea.  SEEK IMMEDIATE MEDICAL CARE IF:   · You have a fever.  · You  develop a rash.  · You have difficulty breathing or sharp pains in your chest.  · You develop any reaction or side effects to medicines given.  MAKE SURE YOU:  · Understand these instructions.  · Will watch your condition.  · Will get help right away if you are not doing well or get worse.     This information is not intended to replace advice given to you by your health care provider. Make sure you discuss any questions you have with your health care provider.     Document Released: 12/18/2006 Document Revised: 05/03/2016 Document Reviewed: 06/26/2012  [employer] Interactive Patient Education ©2016 [employer]vier Inc.          Depression / Suicide Risk    As you are discharged from this [facility] facility, it is important to learn how to keep safe from harming yourself.    Recognize the warning signs:  · Abrupt changes in personality, positive or negative- including increase in energy   · Giving away possessions  · Change in eating patterns- significant weight changes-  positive or negative  · Change in sleeping patterns- unable to sleep or sleeping all the time   · Unwillingness or inability to communicate  · Depression  · Unusual sadness, discouragement and loneliness  · Talk of wanting to die  · Neglect of personal appearance   · Rebelliousness- reckless behavior  · Withdrawal from people/activities they love  · Confusion- inability to concentrate     If you or a loved one observes any of these behaviors or has concerns about self-harm, here's what you can do:  · Talk about it- your feelings and reasons for harming yourself  · Remove any means that you might use to hurt yourself (examples: pills, rope, extension cords, firearm)  · Get professional help from the community (Mental Health, Substance Abuse, psychological counseling)  · Do not be alone:Call your Safe Contact- someone whom you trust who will be there for you.  · Call your local CRISIS HOTLINE 645-2634 or 521-180-1791  · Call your local Children's Mobile  Crisis Response Team [place] (138) 417-6316 or www.WineSimple.FreshPay  · Call the toll free National Suicide Prevention Hotlines   · National Suicide Prevention Lifeline 505-745-ZRDJ (1749)  · National Hope Line Network 800-SUICIDE (460-5161)

## 2018-10-20 NOTE — ED NOTES
Pt updated with plan of care, notified of dirty admit bed assignment. Pt denies any needs. Denies current nausea/pain. Will continue to monitor.

## 2018-10-20 NOTE — CARE PLAN
Problem: Communication  Goal: The ability to communicate needs accurately and effectively will improve  Outcome: PROGRESSING AS EXPECTED  Pt communicates needs appropriately.     Problem: Safety  Goal: Will remain free from falls  Outcome: PROGRESSING AS EXPECTED  Fall precautions in place, pt understands to call for assistance w/IV pole. Pt steady upon ambulation.

## 2018-10-20 NOTE — PROGRESS NOTES
Med rec complete per py at bedside  Ok per Pt to discuss medications with visitor/s present  Allergies have been verified   No ABX within the last 30 days

## 2018-10-20 NOTE — PROGRESS NOTES
Discharging Patient home per physician order.  Discharged with Family.  Demonstrated understanding of discharge instructions, follow up appointments, home medications, prescriptions, home care for surgical wound and nursing care instructions.  Ambulating without assistance, voiding without difficulty, pain well controlled, tolerating oral medications, oxygen saturation greater than 90%, tolerating diet.  Educational handouts given and discussed.  Verbalized understanding of discharge instructions and educational handouts.  All questions answered.  Belongings with patient at time of discharge.

## 2018-10-20 NOTE — OP REPORT
DATE OF SERVICE:  10/20/2018    PREOPERATIVE DIAGNOSIS:  Appendicitis.    POSTOPERATIVE DIAGNOSIS:  Appendicitis.    PROCEDURE PERFORMED:  Laparoscopic appendectomy.    SURGEON:  Rafa Allen MD    ASSISTANT:  MARY Villanueva    ANESTHESIA:  General endotracheal anesthesia.    ANESTHESIOLOGIST:  Paulino Gonzalez MD    ESTIMATED BLOOD LOSS:  5 mL.    SPECIMENS:  Appendix.    COMPLICATIONS:  None.    CONDITION:  Stable.    INDICATIONS FOR PROCEDURE:  This is a 28-year-old male who presents with acute   onset right lower quadrant pain and CT confirmed appendicitis.  He is n.p.o.   and risks, benefits, alternatives of surgery were explained to him before   proceeding.    OPERATIVE FINDINGS:  Inflamed appendix in the retrocecal location removed with   hemostasis.    OPERATIVE TECHNIQUE:  After informed consent was obtained, the patient was   taken to the operating room and placed in the supine position.  After adequate   endotracheal anesthesia was achieved, the abdomen was prepped and draped in   sterile fashion.  Operation was begun by making a 5 mm periumbilical incision   through which a 5 mm trocar was introduced in the abdomen using the Optiview   technique.  Additional trocars were placed, 12 mm in the left lower quadrant   and 5 mm in the suprapubic midline.  All trocars were placed with 0.5%   Marcaine with epinephrine for local anesthesia.    We then positioned the patient and dissected out the retrocecal appendix by   peeling the omental adhesions off of it.  We then carefully dissected out a   window underneath the appendiceal base and divided this with a blue load 40 mm   stapler.  We then used an EnSeal device to divide the appendiceal mesentery   and placed the appendix in an EndoCatch bag and removed it from the 12 mm   trocar site.    Right lower quadrant was noted to be hemostatic.  We did place a clip on the   staple line to ensure this.  We then closed the extraction site with a 0 PDS    using an Endoclose device.  Pneumoperitoneum was reduced and all trocars were   removed.  All skin incisions were closed with 4-0 Monocryl.  Dermabond was   placed.  The patient returned to the PACU in stable condition.  All counts   were correct at the end of the procedure.       ____________________________________     MD EMERSON Caldwell / JERRY    DD:  10/20/2018 15:35:43  DT:  10/20/2018 16:26:30    D#:  [id number]  Job#:  [id number]

## 2018-10-20 NOTE — PROGRESS NOTES
Assumed care at 0700. Received report from night shift RN. Bedside report completed. AOx4.    Denies pain and nausea at this time.  NPO for procedure. +voiding. IVF infusing.   Ambulating with steady gait. Pt call light and belongings within reach, fall precautions in place.   Family at bedside. Plan for procedure today.

## 2018-10-20 NOTE — PROGRESS NOTES
Pt arrived to unit via gurney. Ambulated to bed by himself. Steady upon ambulation.  Pt A&Ox4.  Heart and lung sounds WDL, breathing is unlabored and pt in 93% on RA.  Abdomen soft but tender in RLQ, rates pain 7/10 but declines pain medication at this time.  +void, +flatus, last BM 10/19.   2 RN skin check complete, admission profile complete.  Pt updated on plan of care before surgery, denies further questions at this time.  Call light within reach.   /78   Pulse 88   Temp 36.6 °C (97.8 °F)   Resp 17   Wt 94.8 kg (208 lb 15.9 oz)   SpO2 93%   BMI 32.73 kg/m²

## 2018-10-20 NOTE — ED PROVIDER NOTES
Chief Complaint  Right sided abdominal pain      HPI  Mr. Lagunas is a 27 yo M with no significant PMhx is in ED for Right sided abdominal pain. Reported started yesterday 10/18 around 9 pm, progressive, continuous, mostly in right loin area with radiation to RLL area, associated with nausea. Pt went to urgent care and had labs and imaging work up including CT renal which showed Acute appendicitis in the right lower quadrant with No evidence of rupture or abscess. Pt since 3 Pm today 10/19 has no oral intake. Pt denies fever, chills, diarrhea, apparent GI/ bleeding, dysuria.   Pt denies smoking, alcohol or illicit drugs.       REVIEW OF SYSTEMS  Constitutional: Denies fevers, chills  Eyes: Denies changes in vision, no eye pain  Ears/Nose/Throat/Mouth: Denies nasal congestion or sore throat   Cardiovascular: Denies chest pain or palpitations   Respiratory: Denies shortness of breath , Denies cough  Gastrointestinal/Hepatic: per HPI  Genitourinary: Denies bladder dysfunction, dysuria or frequency  Musculoskeletal/Rheum: Denies  joint pain and swelling   Skin: Denies rash  Neurological: Denies headache, confusion, memory loss or focal weakness/parasthesias  Psychiatric: denies mood disorder   Endocrine: denies hx of diabetes or thyroid dysfunction    No past medical history on file.    History reviewed. No pertinent surgical history.  No family history on file.  Social History     Social History   • Marital status: Single     Spouse name: N/A   • Number of children: N/A   • Years of education: N/A     Occupational History   • Not on file.     Social History Main Topics   • Smoking status: Former Smoker   • Smokeless tobacco: Never Used   • Alcohol use No   • Drug use: No   • Sexual activity: No     Other Topics Concern   • Not on file     Social History Narrative   • No narrative on file         VITALS:   Vitals/ General Appearance:   Weight/BMI: Body mass index is 32.46 kg/m².  Blood pressure 118/77, pulse 87,  temperature 36.7 °C (98.1 °F), resp. rate 14, weight 94 kg (207 lb 3.7 oz), SpO2 97 %.  Vitals:    10/19/18 1839 10/19/18 1840 10/19/18 2102 10/19/18 2138   BP: 129/76  118/77    Pulse: 97  93 87   Resp: 16  14    Temp: 37.6 °C (99.6 °F)  36.7 °C (98.1 °F)    SpO2:   96% 97%   Weight:  94 kg (207 lb 3.7 oz)       Oxygen Therapy:  Pulse Oximetry: 97 %    PHYSICAL EXAM   Constitutional:   Well developed, Well nourished, mild acute distress, Non-toxic appearance.   HENMT:  Normocephalic, Atraumatic, Oropharynx mild dry mucous membranes, No oral exudates, Nose normal.  No thyromegaly.  Neck:  Normal range of motion, No cervical tenderness, Supple  Cardiovascular:  Normal heart rate, Normal rhythm, No murmurs, No rubs, No gallops.   Extremitites with intact distal pulses, no cyanosis, clubbing or edema.  Lungs:  Respiratory effort is normal. Normal breath sounds, breath sounds clear to auscultation bilaterally,  no rales, no rhonchi, no wheezing.   Abdomen: Bowel sounds sluggish, Soft, moderate right sided loin tenderness, no rebound, -ve rovsing sign, -ve obturator sign, -ve iliopsoas sign, No guarding, No masses appreciated  Skin: Warm, Dry, No erythema, No rash, no induration or crepitus.  Neurologic: Alert & oriented x 3, Normal motor function, Normal sensory function, No focal deficits noted  Psychiatric: Affect normal, Judgment normal, Mood normal.      RECENT LABS AND IMAGING MODALITIES:   Lab Results   Component Value Date/Time    WBC 15.2 (H) 10/19/2018 07:57 PM    RBC 5.32 10/19/2018 07:57 PM    HEMOGLOBIN 14.9 10/19/2018 07:57 PM    HEMATOCRIT 45.1 10/19/2018 07:57 PM    MCV 84.8 10/19/2018 07:57 PM    MCH 28.0 10/19/2018 07:57 PM    MCHC 33.0 (L) 10/19/2018 07:57 PM    MPV 12.1 10/19/2018 07:57 PM    NEUTSPOLYS 77.10 (H) 10/19/2018 07:57 PM    LYMPHOCYTES 10.80 (L) 10/19/2018 07:57 PM    MONOCYTES 6.60 10/19/2018 07:57 PM    EOSINOPHILS 4.90 10/19/2018 07:57 PM    BASOPHILS 0.30 10/19/2018 07:57 PM      Lab  Results   Component Value Date/Time    SODIUM 138 10/19/2018 07:57 PM    POTASSIUM 3.8 10/19/2018 07:57 PM    CHLORIDE 102 10/19/2018 07:57 PM    CO2 26 10/19/2018 07:57 PM    GLUCOSE 102 (H) 10/19/2018 07:57 PM    BUN 16 10/19/2018 07:57 PM    CREATININE 0.97 10/19/2018 07:57 PM      Lab Results   Component Value Date/Time    ALTSGPT 33 10/19/2018 07:57 PM    ASTSGOT 23 10/19/2018 07:57 PM    ALKPHOSPHAT 97 10/19/2018 07:57 PM    TBILIRUBIN 0.5 10/19/2018 07:57 PM    LIPASE 30 10/19/2018 07:57 PM    ALBUMIN 4.5 10/19/2018 07:57 PM    GLOBULIN 3.9 (H) 10/19/2018 07:57 PM       ASSESSMENT AND PLAN:     Acute appendicitis      CT renal which showed Acute appendicitis in the right lower quadrant with No evidence of rupture or abscess.  Mild right hydronephrosis without evidence of nephrolithiasis or ureterolithiasis.    Will admit to general surgery service, Dr Allen informed and advised for admission for tomorrow early AM surgery.   Start on maintenance IVF ( 2/2 NPO for surgery tomorrow), IV Abx zosyn, anti-nausea medication    Resident: April Chen 10/19/18 23: 00

## 2018-10-20 NOTE — ED TRIAGE NOTES
29 y/o male ambulate to triage   Chief Complaint   Patient presents with   • Sent by MD   • Abdominal Pain   • N/V     Pt states sent here from  for appendicitis

## 2018-10-22 LAB — PATHOLOGY CONSULT NOTE: NORMAL

## 2018-12-05 ENCOUNTER — APPOINTMENT (OUTPATIENT)
Dept: RADIOLOGY | Facility: IMAGING CENTER | Age: 28
End: 2018-12-05
Attending: PHYSICIAN ASSISTANT
Payer: COMMERCIAL

## 2018-12-05 ENCOUNTER — OFFICE VISIT (OUTPATIENT)
Dept: URGENT CARE | Facility: CLINIC | Age: 28
End: 2018-12-05
Payer: COMMERCIAL

## 2018-12-05 VITALS
OXYGEN SATURATION: 99 % | RESPIRATION RATE: 16 BRPM | HEIGHT: 67 IN | HEART RATE: 97 BPM | SYSTOLIC BLOOD PRESSURE: 122 MMHG | BODY MASS INDEX: 32.96 KG/M2 | DIASTOLIC BLOOD PRESSURE: 70 MMHG | WEIGHT: 210 LBS | TEMPERATURE: 99.6 F

## 2018-12-05 DIAGNOSIS — K59.00 CONSTIPATION, UNSPECIFIED CONSTIPATION TYPE: ICD-10-CM

## 2018-12-05 DIAGNOSIS — R10.84 GENERALIZED ABDOMINAL PAIN: ICD-10-CM

## 2018-12-05 PROCEDURE — 74019 RADEX ABDOMEN 2 VIEWS: CPT | Mod: TC | Performed by: PHYSICIAN ASSISTANT

## 2018-12-05 PROCEDURE — 99214 OFFICE O/P EST MOD 30 MIN: CPT | Performed by: PHYSICIAN ASSISTANT

## 2018-12-05 RX ORDER — DICYCLOMINE HYDROCHLORIDE 10 MG/1
10 CAPSULE ORAL
Qty: 40 CAP | Refills: 0 | Status: SHIPPED | OUTPATIENT
Start: 2018-12-05 | End: 2018-12-15

## 2018-12-06 ASSESSMENT — ENCOUNTER SYMPTOMS
ABDOMINAL PAIN: 1
CONSTIPATION: 1
DIAPHORESIS: 0
WEAKNESS: 0
FEVER: 0
BLOOD IN STOOL: 0
ROS GI COMMENTS: 1
DIARRHEA: 0
HEADACHES: 0
SHORTNESS OF BREATH: 0
CHILLS: 0
PALPITATIONS: 0
HEARTBURN: 0
DIZZINESS: 0
WEIGHT LOSS: 0
CHANGE IN BOWEL HABIT: 1
VOMITING: 0
NAUSEA: 0
COUGH: 0

## 2018-12-06 NOTE — PATIENT INSTRUCTIONS
Abdominal Pain, Adult  Abdominal pain can be caused by many things. Often, abdominal pain is not serious and it gets better with no treatment or by being treated at home. However, sometimes abdominal pain is serious. Your health care provider will do a medical history and a physical exam to try to determine the cause of your abdominal pain.  Follow these instructions at home:  · Take over-the-counter and prescription medicines only as told by your health care provider. Do not take a laxative unless told by your health care provider.  · Drink enough fluid to keep your urine clear or pale yellow.  · Watch your condition for any changes.  · Keep all follow-up visits as told by your health care provider. This is important.  Contact a health care provider if:  · Your abdominal pain changes or gets worse.  · You are not hungry or you lose weight without trying.  · You are constipated or have diarrhea for more than 2-3 days.  · You have pain when you urinate or have a bowel movement.  · Your abdominal pain wakes you up at night.  · Your pain gets worse with meals, after eating, or with certain foods.  · You are throwing up and cannot keep anything down.  · You have a fever.  Get help right away if:  · Your pain does not go away as soon as your health care provider told you to expect.  · You cannot stop throwing up.  · Your pain is only in areas of the abdomen, such as the right side or the left lower portion of the abdomen.  · You have bloody or black stools, or stools that look like tar.  · You have severe pain, cramping, or bloating in your abdomen.  · You have signs of dehydration, such as:  ¨ Dark urine, very little urine, or no urine.  ¨ Cracked lips.  ¨ Dry mouth.  ¨ Sunken eyes.  ¨ Sleepiness.  ¨ Weakness.  This information is not intended to replace advice given to you by your health care provider. Make sure you discuss any questions you have with your health care provider.  Document Released: 09/27/2006 Document  Revised: 07/07/2017 Document Reviewed: 05/31/2017  [employer] Interactive Patient Education © 2017 [employer]

## 2018-12-06 NOTE — PROGRESS NOTES
Subjective:      Jevon Carbajal is a 28 y.o. male who presents with GI Problem (1.5 week patient states he is constipated, pain, pressure)            GI Problem   This is a new problem. The current episode started 1 to 4 weeks ago. The problem occurs intermittently. The problem has been unchanged. Associated symptoms include abdominal pain and a change in bowel habit. Pertinent negatives include no chest pain, chills, coughing, diaphoresis, fever, headaches, nausea, rash, urinary symptoms, vomiting or weakness. The symptoms are aggravated by eating. He has tried nothing for the symptoms.       Review of Systems   Constitutional: Negative for chills, diaphoresis, fever, malaise/fatigue and weight loss.   Respiratory: Negative for cough and shortness of breath.    Cardiovascular: Negative for chest pain and palpitations.   Gastrointestinal: Positive for abdominal pain, change in bowel habit and constipation. Negative for blood in stool, diarrhea, heartburn, melena, nausea and vomiting.        1   Skin: Negative for itching and rash.   Neurological: Negative for dizziness, weakness and headaches.   All other systems reviewed and are negative.    PMH:  has no past medical history on file.  MEDS:   Current Outpatient Prescriptions:   •  dicyclomine (BENTYL) 10 MG Cap, Take 1 Cap by mouth 4 Times a Day,Before Meals and at Bedtime for 10 days., Disp: 40 Cap, Rfl: 0  •  polyethylene glycol 3350 (MIRALAX) Powder, Take 17 g by mouth as needed (constipation)., Disp: 1 Bottle, Rfl: prn  •  multivitamin (THERAGRAN) Tab, Take 1 Tab by mouth every day., Disp: , Rfl:   •  NON SPECIFIED, Take 1 Tab by mouth 3 times a day. GWO SUPPLEMENT, Disp: , Rfl:   •  NON SPECIFIED, Take 1 Tab by mouth 2 Times a Day. MKT76 SUPPLEMENT, Disp: , Rfl:   •  NON SPECIFIED, Take 1 Tab by mouth 2 Times a Day. ESTROGEN BLOCKER SUPPLEMENT, Disp: , Rfl:   •  NON SPECIFIED, Take 1 Tab by mouth 2 Times a Day. LIVER PROTECTOR SUPPLEMENT, Disp: , Rfl:   •  NON  "SPECIFIED, Take 1 Tab by mouth 2 Times a Day. LIPIDRONE SUPPLEMENT 1-2 tablets in the morning 1 tablet after lunch, Disp: , Rfl:   ALLERGIES: No Known Allergies  SURGHX:   Past Surgical History:   Procedure Laterality Date   • APPENDECTOMY LAPAROSCOPIC  10/20/2018    Procedure: APPENDECTOMY LAPAROSCOPIC;  Surgeon: Rafa Allen M.D.;  Location: SURGERY [facility];  Service: General     SOCHX:  reports that he has quit smoking. He has never used smokeless tobacco. He reports that he does not drink alcohol or use drugs.  FH: Family history was reviewed, no pertinent findings to report  Medications, Allergies, and current problem list reviewed today in Epic       Objective:     /70   Pulse 97   Temp 37.6 °C (99.6 °F)   Resp 16   Ht 1.702 m (5' 7\")   Wt 95.3 kg (210 lb)   SpO2 99%   BMI 32.89 kg/m²      Physical Exam   Constitutional: He is oriented to person, place, and time. He appears well-developed and well-nourished. He is active.  Non-toxic appearance. He does not have a sickly appearance. He does not appear ill. No distress.   HENT:   Head: Normocephalic and atraumatic.   Right Ear: External ear normal.   Left Ear: External ear normal.   Nose: Nose normal.   Mouth/Throat: Oropharynx is clear and moist.   Eyes: Conjunctivae, EOM and lids are normal.   Neck: Normal range of motion and full passive range of motion without pain. Neck supple.   Cardiovascular: Normal rate, regular rhythm, S1 normal, S2 normal and normal heart sounds.  Exam reveals no gallop and no friction rub.    No murmur heard.  Pulmonary/Chest: Effort normal and breath sounds normal. No respiratory distress. He has no decreased breath sounds. He has no wheezes. He has no rales. He exhibits no tenderness.   Abdominal: Soft. Normal appearance and bowel sounds are normal. He exhibits no shifting dullness, no distension, no pulsatile liver, no fluid wave, no abdominal bruit, no ascites, no pulsatile midline mass and no mass. " There is no tenderness. There is no rigidity, no rebound, no guarding, no CVA tenderness, no tenderness at McBurney's point and negative Padilla's sign. No hernia.   Musculoskeletal: Normal range of motion. He exhibits no edema, tenderness or deformity.   Neurological: He is alert and oriented to person, place, and time.   Skin: Skin is warm, dry and intact. He is not diaphoretic.   Psychiatric: He has a normal mood and affect. His speech is normal and behavior is normal. Judgment and thought content normal. Cognition and memory are normal.   Vitals reviewed.         12/5/2018 7:17 PM    HISTORY/REASON FOR EXAM: Abdominal Pain  left upper quadrant    TECHNIQUE/EXAM DESCRIPTION AND NUMBER OF VIEWS: Flat and upright views of the abdomen.    COMPARISON: None    FINDINGS:  Flat and upright views of the abdomen show no free air, abnormal bowel dilatation or unusual calcification. Pelvic phleboliths. Vascular clip overlies the right iliac wing. Stool volume is within normal limits.      Impression       Normal two views of the abdomen.             Assessment/Plan:   Patient is a 28-year-old male who presents with 2 weeks of intermittent abdominal pain and constipation.  He states that after various types of meals he will have bloating on the left side of his abdomen.  He states that he has been constipated and has been trying laxatives.  He is passing gas.  Currently he is not having symptoms.  Vitals are normal.  There is no pain to palpation of the abdomen, rebound or guarding.  X-ray of the abdomen shows no signs of bowel blockage or ileus.  We discussed diagnosis differential.  Recommend change in nonfatty foods for his diet, push the fluids and we will trial Bentyl for irritable bowel.  Lab orders given and are pending.    1. Constipation, unspecified constipation type    - MO-JUGEIZD-7 VIEWS; Future  - dicyclomine (BENTYL) 10 MG Cap; Take 1 Cap by mouth 4 Times a Day,Before Meals and at Bedtime for 10 days.   Dispense: 40 Cap; Refill: 0  - COMP METABOLIC PANEL; Future  - CBC WITH DIFFERENTIAL; Future  - LIPASE; Future    2. Generalized abdominal pain    - POCT Urinalysis  - dicyclomine (BENTYL) 10 MG Cap; Take 1 Cap by mouth 4 Times a Day,Before Meals and at Bedtime for 10 days.  Dispense: 40 Cap; Refill: 0  - COMP METABOLIC PANEL; Future  - CBC WITH DIFFERENTIAL; Future  - LIPASE; Future    Differential diagnosis, natural history, supportive care discussed. Follow-up with primary care provider within 7-10 days, emergency room precautions discussed.  Patient and/or family appears understanding of information.  Handout and review of patients diagnosis and treatment was discussed extensively.

## 2019-04-16 ENCOUNTER — OFFICE VISIT (OUTPATIENT)
Dept: URGENT CARE | Facility: CLINIC | Age: 29
End: 2019-04-16
Payer: COMMERCIAL

## 2019-04-16 VITALS
TEMPERATURE: 99.1 F | DIASTOLIC BLOOD PRESSURE: 78 MMHG | SYSTOLIC BLOOD PRESSURE: 118 MMHG | HEIGHT: 67 IN | OXYGEN SATURATION: 95 % | WEIGHT: 219.8 LBS | BODY MASS INDEX: 34.5 KG/M2 | RESPIRATION RATE: 12 BRPM | HEART RATE: 105 BPM

## 2019-04-16 DIAGNOSIS — H10.31 ACUTE BACTERIAL CONJUNCTIVITIS OF RIGHT EYE: ICD-10-CM

## 2019-04-16 PROCEDURE — 99214 OFFICE O/P EST MOD 30 MIN: CPT | Performed by: PHYSICIAN ASSISTANT

## 2019-04-16 RX ORDER — POLYMYXIN B SULFATE AND TRIMETHOPRIM 1; 10000 MG/ML; [USP'U]/ML
1 SOLUTION OPHTHALMIC EVERY 4 HOURS
Qty: 10 ML | Refills: 0 | Status: SHIPPED | OUTPATIENT
Start: 2019-04-16 | End: 2020-07-14

## 2019-04-16 ASSESSMENT — ENCOUNTER SYMPTOMS
FEVER: 0
HEADACHES: 0
CHILLS: 0
DOUBLE VISION: 0
EYE REDNESS: 1
BLURRED VISION: 0
EYE PAIN: 1
PHOTOPHOBIA: 0
EYE DISCHARGE: 1

## 2019-04-16 NOTE — PROGRESS NOTES
"Subjective:   Jevon Carbajal is a 28 y.o. male who presents for Eye Injury (X 1 day right eye, watery)    This is a new problem.  Patient presents to urgent care and onset yesterday of red right eye with yellow-green eye drainage with eyes matted shut in the morning.  Patient denies photosensitivity.  Denies foreign body sensation.  Family member with pinkeye over the weekend.        Eye Injury    Associated symptoms include an eye discharge and eye redness. Pertinent negatives include no blurred vision, double vision, fever or photophobia.     Review of Systems   Constitutional: Negative for chills, fever and malaise/fatigue.   Eyes: Positive for pain, discharge and redness. Negative for blurred vision, double vision and photophobia.   Neurological: Negative for headaches.   All other systems reviewed and are negative.    No Known Allergies     Objective:   /78 (BP Location: Left arm, Patient Position: Sitting, BP Cuff Size: Adult)   Pulse (!) 105   Temp 37.3 °C (99.1 °F) (Temporal)   Resp 12   Ht 1.702 m (5' 7\")   Wt 99.7 kg (219 lb 12.8 oz)   SpO2 95%   BMI 34.43 kg/m²    Physical Exam   Constitutional: He is oriented to person, place, and time. He appears well-developed and well-nourished.   HENT:   Head: Normocephalic and atraumatic.   Right Ear: Tympanic membrane, external ear and ear canal normal.   Left Ear: Tympanic membrane, external ear and ear canal normal.   Nose: Nose normal.   Mouth/Throat: Uvula is midline, oropharynx is clear and moist and mucous membranes are normal. No oropharyngeal exudate.   Eyes: Pupils are equal, round, and reactive to light. EOM and lids are normal. Lids are everted and swept, no foreign bodies found. Right eye exhibits discharge. Left eye exhibits no discharge. Right conjunctiva is injected. Left conjunctiva is not injected.   Slit lamp exam:       The right eye shows no corneal abrasion, no fluorescein uptake and no anterior chamber bulge.   Neck: Normal range of " motion. Neck supple.   Cardiovascular: Normal rate, regular rhythm and normal heart sounds.  Exam reveals no friction rub.    No murmur heard.  Pulmonary/Chest: Effort normal and breath sounds normal. No respiratory distress.   Musculoskeletal: Normal range of motion.   Lymphadenopathy:        Head (right side): No submental, no submandibular, no tonsillar, no preauricular and no posterior auricular adenopathy present.        Head (left side): No submental, no submandibular, no tonsillar, no preauricular and no posterior auricular adenopathy present.     He has no cervical adenopathy.        Right: No supraclavicular adenopathy present.        Left: No supraclavicular adenopathy present.   Neurological: He is alert and oriented to person, place, and time. He has normal strength. No cranial nerve deficit or sensory deficit. Coordination normal.   Skin: Skin is warm and dry. No rash noted.   Psychiatric: He has a normal mood and affect. Judgment normal.   Vitals reviewed.          Assessment/Plan:   Assessment    1. Acute bacterial conjunctivitis of right eye  - polymixin-trimethoprim (POLYTRIM) 29562-1.1 UNIT/ML-% Solution; Place 1 Drop in both eyes every 4 hours.  Dispense: 10 mL; Refill: 0    Patient will be treated with Polytrim as above.  Recommend warm moist compresses.  Note given off work today.    Differential diagnosis, natural history, supportive care, and indications for immediate follow-up discussed.    If not improving in 3-5 days, F/U with PCP or return to  or sooner if worsens    Red flag warning symptoms and strict ER/follow-up precautions given.    Please note that this note was created using voice recognition speech to text software. Every effort has been made to correct obvious errors.  However, I expect there are errors of grammar and possibly context that were not discovered prior to finalizing the note    GIANLUCA Mandujano PA-C

## 2019-04-16 NOTE — LETTER
April 16, 2019         Patient: Jevon Carbajal   YOB: 1990   Date of Visit: 4/16/2019           To Whom it May Concern:    Jevon Carbajal was seen in my clinic on 4/16/2019. He may return to work on 4/17/19..    If you have any questions or concerns, please don't hesitate to call.        Sincerely,           Mai Mandujano P.A.-C.  Electronically Signed

## 2019-04-16 NOTE — PATIENT INSTRUCTIONS
"Conjunctivitis  Conjunctivitis is commonly called \"pink eye.\" Conjunctivitis can be caused by bacterial or viral infection, allergies, or injuries. There is usually redness of the lining of the eye, itching, discomfort, and sometimes discharge. There may be deposits of matter along the eyelids. A viral infection usually causes a watery discharge, while a bacterial infection causes a yellowish, thick discharge. Pink eye is very contagious and spreads by direct contact.  You may be given antibiotic eyedrops as part of your treatment. Before using your eye medicine, remove all drainage from the eye by washing gently with warm water and cotton balls. Continue to use the medication until you have awakened 2 mornings in a row without discharge from the eye. Do not rub your eye. This increases the irritation and helps spread infection. Use separate towels from other household members. Wash your hands with soap and water before and after touching your eyes. Use cold compresses to reduce pain and sunglasses to relieve irritation from light. Do not wear contact lenses or wear eye makeup until the infection is gone.  SEEK MEDICAL CARE IF:   · Your symptoms are not better after 3 days of treatment.  · You have increased pain or trouble seeing.  · The outer eyelids become very red or swollen.  Document Released: 01/25/2006 Document Revised: 03/11/2013 Document Reviewed: 12/18/2006  [employer]® Patient Information ©2014 [employer].    "

## 2020-07-14 ENCOUNTER — HOSPITAL ENCOUNTER (OUTPATIENT)
Facility: MEDICAL CENTER | Age: 30
End: 2020-07-14
Attending: PHYSICIAN ASSISTANT
Payer: COMMERCIAL

## 2020-07-14 ENCOUNTER — OFFICE VISIT (OUTPATIENT)
Dept: URGENT CARE | Facility: CLINIC | Age: 30
End: 2020-07-14
Payer: COMMERCIAL

## 2020-07-14 VITALS
BODY MASS INDEX: 43.95 KG/M2 | OXYGEN SATURATION: 94 % | TEMPERATURE: 98.7 F | SYSTOLIC BLOOD PRESSURE: 136 MMHG | HEIGHT: 67 IN | WEIGHT: 280 LBS | DIASTOLIC BLOOD PRESSURE: 80 MMHG | RESPIRATION RATE: 17 BRPM | HEART RATE: 114 BPM

## 2020-07-14 DIAGNOSIS — Z20.2 EXPOSURE TO TRICHOMONAS: ICD-10-CM

## 2020-07-14 LAB
APPEARANCE UR: CLEAR
BILIRUB UR STRIP-MCNC: NEGATIVE MG/DL
COLOR UR AUTO: NORMAL
GLUCOSE UR STRIP.AUTO-MCNC: NEGATIVE MG/DL
KETONES UR STRIP.AUTO-MCNC: NEGATIVE MG/DL
LEUKOCYTE ESTERASE UR QL STRIP.AUTO: NEGATIVE
NITRITE UR QL STRIP.AUTO: NEGATIVE
PH UR STRIP.AUTO: 6 [PH] (ref 5–8)
PROT UR QL STRIP: NEGATIVE MG/DL
RBC UR QL AUTO: NORMAL
SP GR UR STRIP.AUTO: 1.02
UROBILINOGEN UR STRIP-MCNC: 0.2 MG/DL

## 2020-07-14 PROCEDURE — 87591 N.GONORRHOEAE DNA AMP PROB: CPT

## 2020-07-14 PROCEDURE — 81002 URINALYSIS NONAUTO W/O SCOPE: CPT | Performed by: PHYSICIAN ASSISTANT

## 2020-07-14 PROCEDURE — 87086 URINE CULTURE/COLONY COUNT: CPT

## 2020-07-14 PROCEDURE — 87491 CHLMYD TRACH DNA AMP PROBE: CPT

## 2020-07-14 PROCEDURE — 99214 OFFICE O/P EST MOD 30 MIN: CPT | Performed by: PHYSICIAN ASSISTANT

## 2020-07-14 RX ORDER — METRONIDAZOLE 500 MG/1
500 TABLET ORAL 2 TIMES DAILY
Qty: 14 TAB | Refills: 0 | Status: SHIPPED | OUTPATIENT
Start: 2020-07-14 | End: 2020-07-21

## 2020-07-14 ASSESSMENT — ENCOUNTER SYMPTOMS
FEVER: 0
FLANK PAIN: 0
CHILLS: 0

## 2020-07-14 ASSESSMENT — FIBROSIS 4 INDEX: FIB4 SCORE: 0.7

## 2020-07-14 NOTE — PROGRESS NOTES
Subjective:   Jevon Carbajal is a 29 y.o. male who presents today with   Chief Complaint   Patient presents with   • Exposure to STD     exposed to trich.       Exposure to STD   This is a new problem. The current episode started in the past 7 days. The problem occurs constantly. The problem has been unchanged. Pertinent negatives include no chills, fever, rash or urinary symptoms. Nothing aggravates the symptoms. He has tried nothing for the symptoms. The treatment provided no relief.     Patient's girlfriend is pregnant and tested positive for trichomonas.  Patient is asymptomatic at this time.  Patient's girlfriend is waiting to take her medications until he gets his.  PMH:  has no past medical history on file.  MEDS:   Current Outpatient Medications:   •  metroNIDAZOLE (FLAGYL) 500 MG Tab, Take 1 Tab by mouth 2 Times a Day for 7 days., Disp: 14 Tab, Rfl: 0  •  NON SPECIFIED, Take 1 Tab by mouth 3 times a day. GWO SUPPLEMENT, Disp: , Rfl:   •  NON SPECIFIED, Take 1 Tab by mouth 2 Times a Day. MKT76 SUPPLEMENT, Disp: , Rfl:   •  NON SPECIFIED, Take 1 Tab by mouth 2 Times a Day. ESTROGEN BLOCKER SUPPLEMENT, Disp: , Rfl:   •  NON SPECIFIED, Take 1 Tab by mouth 2 Times a Day. LIVER PROTECTOR SUPPLEMENT, Disp: , Rfl:   •  multivitamin (THERAGRAN) Tab, Take 1 Tab by mouth every day., Disp: , Rfl:   •  NON SPECIFIED, Take 1 Tab by mouth 2 Times a Day. LIPIDRONE SUPPLEMENT 1-2 tablets in the morning 1 tablet after lunch, Disp: , Rfl:   ALLERGIES: No Known Allergies  SURGHX:   Past Surgical History:   Procedure Laterality Date   • APPENDECTOMY LAPAROSCOPIC  10/20/2018    Procedure: APPENDECTOMY LAPAROSCOPIC;  Surgeon: Rafa Allen M.D.;  Location: SURGERY [facility];  Service: General     SOCHX:  reports that he has been smoking. He has never used smokeless tobacco. He reports that he does not drink alcohol or use drugs.  FH: Reviewed with patient, not pertinent to this visit.       Review of Systems  "  Constitutional: Negative for chills and fever.   Genitourinary: Negative for dysuria, flank pain, frequency, hematuria and urgency.   Skin: Negative for itching and rash.   All other systems reviewed and are negative.       Objective:   /80   Pulse (!) 114   Temp 37.1 °C (98.7 °F) (Temporal)   Resp 17   Ht 1.702 m (5' 7\")   Wt (!) 127 kg (280 lb)   SpO2 94%   BMI 43.85 kg/m²   Physical Exam  Vitals signs and nursing note reviewed.   Constitutional:       General: He is not in acute distress.     Appearance: Normal appearance. He is well-developed. He is not ill-appearing, toxic-appearing or diaphoretic.   HENT:      Head: Normocephalic and atraumatic.      Right Ear: Hearing normal.      Left Ear: Hearing normal.   Eyes:      Pupils: Pupils are equal, round, and reactive to light.   Cardiovascular:      Rate and Rhythm: Normal rate and regular rhythm.      Heart sounds: Normal heart sounds.   Pulmonary:      Effort: Pulmonary effort is normal.   Abdominal:      Tenderness: There is abdominal tenderness in the suprapubic area. There is no right CVA tenderness or left CVA tenderness.   Genitourinary:     Comments: Patient deferred  exam  Musculoskeletal:      Comments: Normal movement in all 4 extremities   Skin:     General: Skin is warm and dry.   Neurological:      Mental Status: He is alert.      Coordination: Coordination normal.   Psychiatric:         Mood and Affect: Mood normal.       UA NEG    Assessment/Plan:   Assessment    1. Exposure to trichomonas  - POCT Urinalysis  - Urine Culture; Future  - CHLAMYDIA/GC PCR URINE OR SWAB; Future  - metroNIDAZOLE (FLAGYL) 500 MG Tab; Take 1 Tab by mouth 2 Times a Day for 7 days.  Dispense: 14 Tab; Refill: 0  Patient will be treated today given that his partner was positive.  Discussed no alcohol while taking antibiotics.  Differential diagnosis, natural history, supportive care, and indications for immediate follow-up discussed.   Patient given " instructions and understanding of medications and treatment.    If not improving in 3-5 days, F/U with PCP or return to UC if symptoms worsen.    Patient agreeable to plan.      Please note that this dictation was created using voice recognition software. I have made every reasonable attempt to correct obvious errors, but I expect that there are errors of grammar and possibly content that I did not discover before finalizing the note.    Avinash Lowe PA-C

## 2020-07-15 LAB
C TRACH DNA SPEC QL NAA+PROBE: NEGATIVE
N GONORRHOEA DNA SPEC QL NAA+PROBE: NEGATIVE
SPECIMEN SOURCE: NORMAL

## 2020-07-17 LAB
BACTERIA UR CULT: NORMAL
SIGNIFICANT IND 70042: NORMAL
SITE SITE: NORMAL
SOURCE SOURCE: NORMAL

## 2021-12-23 ENCOUNTER — OFFICE VISIT (OUTPATIENT)
Dept: URGENT CARE | Facility: CLINIC | Age: 31
End: 2021-12-23
Payer: COMMERCIAL

## 2021-12-23 VITALS
WEIGHT: 283 LBS | SYSTOLIC BLOOD PRESSURE: 140 MMHG | HEART RATE: 117 BPM | DIASTOLIC BLOOD PRESSURE: 100 MMHG | HEIGHT: 67 IN | BODY MASS INDEX: 44.42 KG/M2 | OXYGEN SATURATION: 94 % | RESPIRATION RATE: 16 BRPM | TEMPERATURE: 97.3 F

## 2021-12-23 DIAGNOSIS — L08.9 SKIN INFECTION: ICD-10-CM

## 2021-12-23 DIAGNOSIS — R73.9 HYPERGLYCEMIA: ICD-10-CM

## 2021-12-23 DIAGNOSIS — S31.21XA LACERATION OF PENIS, INITIAL ENCOUNTER: ICD-10-CM

## 2021-12-23 DIAGNOSIS — R81 GLUCOSURIA: ICD-10-CM

## 2021-12-23 DIAGNOSIS — E11.9 TYPE 2 DIABETES MELLITUS WITHOUT COMPLICATION, WITHOUT LONG-TERM CURRENT USE OF INSULIN (HCC): ICD-10-CM

## 2021-12-23 LAB
GLUCOSE BLD-MCNC: 354 MG/DL (ref 70–100)
HBA1C MFR BLD: 9.8 % (ref 0–5.6)
INT CON NEG: ABNORMAL
INT CON POS: ABNORMAL

## 2021-12-23 PROCEDURE — 83036 HEMOGLOBIN GLYCOSYLATED A1C: CPT | Performed by: NURSE PRACTITIONER

## 2021-12-23 PROCEDURE — 99214 OFFICE O/P EST MOD 30 MIN: CPT | Performed by: NURSE PRACTITIONER

## 2021-12-23 PROCEDURE — 82962 GLUCOSE BLOOD TEST: CPT | Performed by: NURSE PRACTITIONER

## 2021-12-23 RX ORDER — SULFAMETHOXAZOLE AND TRIMETHOPRIM 800; 160 MG/1; MG/1
1 TABLET ORAL 2 TIMES DAILY
Qty: 14 TABLET | Refills: 0 | Status: SHIPPED | OUTPATIENT
Start: 2021-12-23 | End: 2021-12-30

## 2021-12-23 NOTE — PROGRESS NOTES
Subjective:   Jevon Carbajal is a 31 y.o. male who presents for UTI (burning with urination, cj has cuts that also hurt)       HPI  Pt presents for evaluation of a new problem, reports 3 small abrasions on the tip of his penis as well as on his foreskin.  Patient reports that this was due to recent intimate relations.  Patient states that due to not being circumcised, he will often accidentally remove the scab on the tip of his penis.  Patient denies any urinary frequency, burning, or pain.  In review of urinalysis, it is noted that patient has glucosuria, he states that he has family members with type 2 diabetes. He denies polyuria, polydipsia, or polyphagia. Patient states that he does not follow a normal diet or exercise routine.    Review of Systems   Constitutional: Negative.    HENT: Negative.    Eyes: Negative.    Respiratory: Negative.    Cardiovascular: Negative.    Gastrointestinal: Negative.    Genitourinary: Positive for dysuria.        Penis laceration, sensitivity   Musculoskeletal: Negative.    Skin: Negative.    Neurological: Negative.    Psychiatric/Behavioral: Negative.    All other systems reviewed and are negative.        MEDS:   Current Outpatient Medications:   •  NON SPECIFIED, Take 1 Tab by mouth 3 times a day. GWO SUPPLEMENT (Patient not taking: Reported on 12/23/2021), Disp: , Rfl:   •  NON SPECIFIED, Take 1 Tab by mouth 2 Times a Day. MKT76 SUPPLEMENT (Patient not taking: Reported on 12/23/2021), Disp: , Rfl:   •  NON SPECIFIED, Take 1 Tab by mouth 2 Times a Day. ESTROGEN BLOCKER SUPPLEMENT (Patient not taking: Reported on 12/23/2021), Disp: , Rfl:   •  NON SPECIFIED, Take 1 Tab by mouth 2 Times a Day. LIVER PROTECTOR SUPPLEMENT (Patient not taking: Reported on 12/23/2021), Disp: , Rfl:   •  multivitamin (THERAGRAN) Tab, Take 1 Tab by mouth every day. (Patient not taking: Reported on 12/23/2021), Disp: , Rfl:   •  NON SPECIFIED, Take 1 Tab by mouth 2 Times a Day. LIPIDRONE SUPPLEMENT 1-2  "tablets in the morning 1 tablet after lunch (Patient not taking: Reported on 12/23/2021), Disp: , Rfl:   ALLERGIES: No Known Allergies    Patient's PMH, SocHx, SurgHx, FamHx, Drug allergies and medications were reviewed.     Objective:   /100   Pulse (!) 117   Temp 36.3 °C (97.3 °F) (Temporal)   Resp 16   Ht 1.702 m (5' 7\")   Wt (!) 128 kg (283 lb)   SpO2 94%   BMI 44.32 kg/m²     Physical Exam  Vitals and nursing note reviewed.   Constitutional:       General: He is awake.      Appearance: Normal appearance. He is well-developed.   HENT:      Head: Normocephalic and atraumatic.      Right Ear: External ear normal.      Left Ear: External ear normal.      Nose: Nose normal.      Mouth/Throat:      Lips: Pink.      Mouth: Mucous membranes are moist.      Pharynx: Oropharynx is clear.   Eyes:      General: Lids are normal.      Extraocular Movements: Extraocular movements intact.      Conjunctiva/sclera: Conjunctivae normal.   Cardiovascular:      Rate and Rhythm: Normal rate and regular rhythm.   Pulmonary:      Effort: Pulmonary effort is normal.   Musculoskeletal:         General: Normal range of motion.      Cervical back: Normal range of motion.   Skin:     General: Skin is warm and dry.   Neurological:      Mental Status: He is alert and oriented to person, place, and time.   Psychiatric:         Mood and Affect: Mood normal.         Behavior: Behavior normal. Behavior is cooperative.         Thought Content: Thought content normal.         Judgment: Judgment normal.         Assessment/Plan:   Assessment    1. Type 2 diabetes mellitus without complication, without long-term current use of insulin ([facility])  - Referral to establish with [facility] PCP  - metFORMIN (GLUCOPHAGE) 500 MG Tab; Take 1 Tablet by mouth 2 times a day with meals.  Dispense: 60 Tablet; Refill: 0    2. Skin infection  - mupirocin (BACTROBAN) 2 % Ointment; Apply 1 Application topically 2 times a day.  Dispense: 22 g; Refill: 0  - " sulfamethoxazole-trimethoprim (BACTRIM DS) 800-160 MG tablet; Take 1 Tablet by mouth 2 times a day for 7 days.  Dispense: 14 Tablet; Refill: 0    3. Laceration of penis, initial encounter  - mupirocin (BACTROBAN) 2 % Ointment; Apply 1 Application topically 2 times a day.  Dispense: 22 g; Refill: 0  - sulfamethoxazole-trimethoprim (BACTRIM DS) 800-160 MG tablet; Take 1 Tablet by mouth 2 times a day for 7 days.  Dispense: 14 Tablet; Refill: 0    4. Glucosuria  - POCT Glucose  - Referral to establish with [facility] PCP  - metFORMIN (GLUCOPHAGE) 500 MG Tab; Take 1 Tablet by mouth 2 times a day with meals.  Dispense: 60 Tablet; Refill: 0  - POCT  A1C    5. Hyperglycemia  - metFORMIN (GLUCOPHAGE) 500 MG Tab; Take 1 Tablet by mouth 2 times a day with meals.  Dispense: 60 Tablet; Refill: 0    6. BMI 40.0-44.9, adult (HCC)    Vital signs stable at today's acute urgent care visit. Patient is not tachycardic at time of exam.  Reviewed test results that were completed in the clinic.  Begin medications as listed. Discussed management options (risks, benefits, and alternatives to treatment).     Urgent referral placed for the patient to follow-up with the primary care provider for recheck, reevaluation, and/or consideration of further management. Return to urgent care with any worsening symptoms or if there is no improvement in their current condition. Red flags discussed and indications to immediately call 911 or present to the ED.  All questions were encouraged and answered to the patient's satisfaction and understanding, and they agree to the plan of care.     I personally reviewed prior external notes and test results pertinent to today's visit.  I have independently reviewed and interpreted all diagnostics ordered during this urgent care acute visit. Time spent evaluating this patient was a minimum of 30 minutes and includes preparing for visit, counseling/education, exam, evaluation, obtaining history, and ordering  lab/test/procedures.      Please note that this dictation was created using voice recognition software. I have made a reasonable attempt to correct obvious errors, but I expect that there are errors of grammar and possibly content that I did not discover before finalizing the note.

## 2021-12-28 ASSESSMENT — ENCOUNTER SYMPTOMS
RESPIRATORY NEGATIVE: 1
GASTROINTESTINAL NEGATIVE: 1
EYES NEGATIVE: 1
CARDIOVASCULAR NEGATIVE: 1
CONSTITUTIONAL NEGATIVE: 1
PSYCHIATRIC NEGATIVE: 1
MUSCULOSKELETAL NEGATIVE: 1
NEUROLOGICAL NEGATIVE: 1

## 2022-01-05 ENCOUNTER — TELEPHONE (OUTPATIENT)
Dept: SCHEDULING | Facility: IMAGING CENTER | Age: 32
End: 2022-01-05

## 2022-01-24 ENCOUNTER — TELEPHONE (OUTPATIENT)
Dept: SCHEDULING | Facility: IMAGING CENTER | Age: 32
End: 2022-01-24

## 2022-01-24 ENCOUNTER — APPOINTMENT (OUTPATIENT)
Dept: MEDICAL GROUP | Facility: PHYSICIAN GROUP | Age: 32
End: 2022-01-24
Payer: COMMERCIAL

## 2022-02-09 ENCOUNTER — APPOINTMENT (OUTPATIENT)
Dept: MEDICAL GROUP | Facility: MEDICAL CENTER | Age: 32
End: 2022-02-09
Attending: NURSE PRACTITIONER
Payer: COMMERCIAL

## 2022-02-25 ENCOUNTER — OFFICE VISIT (OUTPATIENT)
Dept: MEDICAL GROUP | Facility: MEDICAL CENTER | Age: 32
End: 2022-02-25
Payer: COMMERCIAL

## 2022-02-25 VITALS
WEIGHT: 287.4 LBS | BODY MASS INDEX: 45.11 KG/M2 | TEMPERATURE: 98.1 F | HEART RATE: 98 BPM | OXYGEN SATURATION: 97 % | SYSTOLIC BLOOD PRESSURE: 116 MMHG | DIASTOLIC BLOOD PRESSURE: 82 MMHG | HEIGHT: 67 IN

## 2022-02-25 DIAGNOSIS — E11.9 TYPE 2 DIABETES MELLITUS WITHOUT COMPLICATION, WITHOUT LONG-TERM CURRENT USE OF INSULIN (HCC): ICD-10-CM

## 2022-02-25 DIAGNOSIS — M54.6 ACUTE RIGHT-SIDED THORACIC BACK PAIN: ICD-10-CM

## 2022-02-25 PROCEDURE — 99214 OFFICE O/P EST MOD 30 MIN: CPT | Performed by: FAMILY MEDICINE

## 2022-02-25 RX ORDER — GLUCOSAMINE HCL/CHONDROITIN SU 500-400 MG
CAPSULE ORAL
Qty: 100 EACH | Refills: 0 | Status: SHIPPED | OUTPATIENT
Start: 2022-02-25 | End: 2022-07-27

## 2022-02-25 RX ORDER — LANCETS 30 GAUGE
EACH MISCELLANEOUS
Qty: 100 EACH | Refills: 0 | Status: SHIPPED | OUTPATIENT
Start: 2022-02-25 | End: 2022-02-28

## 2022-02-25 ASSESSMENT — PATIENT HEALTH QUESTIONNAIRE - PHQ9: CLINICAL INTERPRETATION OF PHQ2 SCORE: 0

## 2022-02-25 NOTE — PATIENT INSTRUCTIONS
Metformin: 500 mg   Week 1: time per day  Week 2: 1 in the morning and 1 in the evening  Week 3: 2 in the morning and 1 in the evening  Week 4: 2 in the morning and 1 in the evening      Diabetes Mellitus and Nutrition, Adult  When you have diabetes (diabetes mellitus), it is very important to have healthy eating habits because your blood sugar (glucose) levels are greatly affected by what you eat and drink. Eating healthy foods in the appropriate amounts, at about the same times every day, can help you:  · Control your blood glucose.  · Lower your risk of heart disease.  · Improve your blood pressure.  · Reach or maintain a healthy weight.  Every person with diabetes is different, and each person has different needs for a meal plan. Your health care provider may recommend that you work with a diet and nutrition specialist (dietitian) to make a meal plan that is best for you. Your meal plan may vary depending on factors such as:  · The calories you need.  · The medicines you take.  · Your weight.  · Your blood glucose, blood pressure, and cholesterol levels.  · Your activity level.  · Other health conditions you have, such as heart or kidney disease.  How do carbohydrates affect me?  Carbohydrates, also called carbs, affect your blood glucose level more than any other type of food. Eating carbs naturally raises the amount of glucose in your blood. Carb counting is a method for keeping track of how many carbs you eat. Counting carbs is important to keep your blood glucose at a healthy level, especially if you use insulin or take certain oral diabetes medicines.  It is important to know how many carbs you can safely have in each meal. This is different for every person. Your dietitian can help you calculate how many carbs you should have at each meal and for each snack.  Foods that contain carbs include:  · Bread, cereal, rice, pasta, and crackers.  · Potatoes and corn.  · Peas, beans, and lentils.  · Milk and  "yogurt.  · Fruit and juice.  · Desserts, such as cakes, cookies, ice cream, and candy.  How does alcohol affect me?  Alcohol can cause a sudden decrease in blood glucose (hypoglycemia), especially if you use insulin or take certain oral diabetes medicines. Hypoglycemia can be a life-threatening condition. Symptoms of hypoglycemia (sleepiness, dizziness, and confusion) are similar to symptoms of having too much alcohol.  If your health care provider says that alcohol is safe for you, follow these guidelines:  · Limit alcohol intake to no more than 1 drink per day for nonpregnant women and 2 drinks per day for men. One drink equals 12 oz of beer, 5 oz of wine, or 1½ oz of hard liquor.  · Do not drink on an empty stomach.  · Keep yourself hydrated with water, diet soda, or unsweetened iced tea.  · Keep in mind that regular soda, juice, and other mixers may contain a lot of sugar and must be counted as carbs.  What are tips for following this plan?    Reading food labels  · Start by checking the serving size on the \"Nutrition Facts\" label of packaged foods and drinks. The amount of calories, carbs, fats, and other nutrients listed on the label is based on one serving of the item. Many items contain more than one serving per package.  · Check the total grams (g) of carbs in one serving. You can calculate the number of servings of carbs in one serving by dividing the total carbs by 15. For example, if a food has 30 g of total carbs, it would be equal to 2 servings of carbs.  · Check the number of grams (g) of saturated and trans fats in one serving. Choose foods that have low or no amount of these fats.  · Check the number of milligrams (mg) of salt (sodium) in one serving. Most people should limit total sodium intake to less than 2,300 mg per day.  · Always check the nutrition information of foods labeled as \"low-fat\" or \"nonfat\". These foods may be higher in added sugar or refined carbs and should be avoided.  · Talk to " your dietitian to identify your daily goals for nutrients listed on the label.  Shopping  · Avoid buying canned, premade, or processed foods. These foods tend to be high in fat, sodium, and added sugar.  · Shop around the outside edge of the grocery store. This includes fresh fruits and vegetables, bulk grains, fresh meats, and fresh dairy.  Cooking  · Use low-heat cooking methods, such as baking, instead of high-heat cooking methods like deep frying.  · Cook using healthy oils, such as olive, canola, or sunflower oil.  · Avoid cooking with butter, cream, or high-fat meats.  Meal planning  · Eat meals and snacks regularly, preferably at the same times every day. Avoid going long periods of time without eating.  · Eat foods high in fiber, such as fresh fruits, vegetables, beans, and whole grains. Talk to your dietitian about how many servings of carbs you can eat at each meal.  · Eat 4-6 ounces (oz) of lean protein each day, such as lean meat, chicken, fish, eggs, or tofu. One oz of lean protein is equal to:  ? 1 oz of meat, chicken, or fish.  ? 1 egg.  ? ¼ cup of tofu.  · Eat some foods each day that contain healthy fats, such as avocado, nuts, seeds, and fish.  Lifestyle  · Check your blood glucose regularly.  · Exercise regularly as told by your health care provider. This may include:  ? 150 minutes of moderate-intensity or vigorous-intensity exercise each week. This could be brisk walking, biking, or water aerobics.  ? Stretching and doing strength exercises, such as yoga or weightlifting, at least 2 times a week.  · Take medicines as told by your health care provider.  · Do not use any products that contain nicotine or tobacco, such as cigarettes and e-cigarettes. If you need help quitting, ask your health care provider.  · Work with a counselor or diabetes educator to identify strategies to manage stress and any emotional and social challenges.  Questions to ask a health care provider  · Do I need to meet with  a diabetes educator?  · Do I need to meet with a dietitian?  · What number can I call if I have questions?  · When are the best times to check my blood glucose?  Where to find more information:  · American Diabetes Association: diabetes.org  · Academy of Nutrition and Dietetics: www.eatright.org  · National [place] of Diabetes and Digestive and Kidney Diseases (NIH): www.niddk.nih.gov  Summary  · A healthy meal plan will help you control your blood glucose and maintain a healthy lifestyle.  · Working with a diet and nutrition specialist (dietitian) can help you make a meal plan that is best for you.  · Keep in mind that carbohydrates (carbs) and alcohol have immediate effects on your blood glucose levels. It is important to count carbs and to use alcohol carefully.  This information is not intended to replace advice given to you by your health care provider. Make sure you discuss any questions you have with your health care provider.  Document Released: 09/14/2006 Document Revised: 11/30/2018 Document Reviewed: 01/22/2018  Elsevier Patient Education © 2020 [employer]

## 2022-02-26 NOTE — ASSESSMENT & PLAN NOTE
Patient has uncontrolled type 2 diabetes and was unaware of the seriousness of his condition just how high her sugars are.  Heather restarted on the Metformin with 1 tab a day for a week and go up every week until her up to 2 tabs twice daily.  I am also getting a glucometer so we can start keeping track of her sugars and keep a log.  Read some information about diabetes and diet choices.  We will have patient follow-up in a month to catch up on other diabetic topics.

## 2022-02-26 NOTE — ASSESSMENT & PLAN NOTE
Discussed trial of conservative therapy at home have him ice, heat, gentle stretching  We will reevaluate at next appointment

## 2022-02-26 NOTE — PROGRESS NOTES
Subjective:     CC:  Diagnoses of Type 2 diabetes mellitus without complication, without long-term current use of insulin (HCC) and Acute right-sided thoracic back pain were pertinent to this visit.    HISTORY OF THE PRESENT ILLNESS: Patient is a 31 y.o. male. This pleasant patient is here today to establish care and discuss sugars.     Problem   Type 2 Diabetes Mellitus Without Complication, Without Long-Term Current Use of Insulin (Hcc)    Patient was seen at urgent care at the end of December.  He had an A1c of 9.8 and a random glucose of 354.  Unfortunately did not understand what was communicated to him about this and thought he was still prediabetic.  He was prescribed Metformin 500 mg twice daily he has been taking this off and on not consistently and not necessarily twice a day.  He does endorse feeling tired and sometimes a little lightheaded.  He is not having increased blurry vision, he denies polyuria polydipsia but does endorse polyphagia.     Acute Right-Sided Thoracic Back Pain    Patient tells me he is waking up with right-sided mid back pain.  He thinks it might have been his bed.  Is been going on for 3 weeks.  It does not radiate.  But it is uncomfortable and takes a while to get out of bed.  He does not tell me that it gets better once he starts moving around after a while.  No known trauma.         Current Outpatient Medications Ordered in Epic   Medication Sig Dispense Refill   • Blood Glucose Meter Kit Test blood sugar as recommended by provider. Freestyle Rochelle blood glucose monitoring kit. 1 Kit 0   • Blood Glucose Test Strips Use one Freestyle Rochelle strip to test blood sugar once daily early morning before first meal. 100 Strip 0   • Lancets Use one Freestyle Rochelle lancet to test blood sugar once daily early morning before first meal. 100 Each 0   • Alcohol Swabs Wipe site with prep pad prior to injection. 100 Each 0   • metFORMIN (GLUCOPHAGE) 500 MG Tab Take 2 Tablets by mouth 2 times a  "day with meals. 120 Tablet 0     No current Epic-ordered facility-administered medications on file.       Health Maintenance: To be discussed further at follow-up visit    ROS:   ROS see HPI      Objective:       Exam: /82   Pulse 98   Temp 36.7 °C (98.1 °F) (Temporal)   Ht 1.702 m (5' 7\")   Wt (!) 130 kg (287 lb 6.4 oz)   SpO2 97%  Body mass index is 45.01 kg/m².    Physical Exam  Vitals reviewed.   Constitutional:       General: He is not in acute distress.     Appearance: Normal appearance. He is obese.   HENT:      Head: Normocephalic and atraumatic.   Cardiovascular:      Rate and Rhythm: Normal rate and regular rhythm.      Heart sounds: Normal heart sounds.   Pulmonary:      Effort: Pulmonary effort is normal.      Breath sounds: Normal breath sounds.   Neurological:      Mental Status: He is alert. Mental status is at baseline.   Psychiatric:         Mood and Affect: Mood normal.         Behavior: Behavior normal.           Assessment & Plan:   31 y.o. male with the following -    Problem List Items Addressed This Visit     Type 2 diabetes mellitus without complication, without long-term current use of insulin (HCC)     Patient has uncontrolled type 2 diabetes and was unaware of the seriousness of his condition just how high her sugars are.  Heather restarted on the Metformin with 1 tab a day for a week and go up every week until her up to 2 tabs twice daily.  I am also getting a glucometer so we can start keeping track of her sugars and keep a log.  Read some information about diabetes and diet choices.  We will have patient follow-up in a month to catch up on other diabetic topics.         Relevant Medications    metFORMIN (GLUCOPHAGE) 500 MG Tab    Acute right-sided thoracic back pain     Discussed trial of conservative therapy at home have him ice, heat, gentle stretching  We will reevaluate at next appointment               Return in about 4 weeks (around 3/25/2022) for Diabetes F/U.    Please " note that this dictation was created using voice recognition software. I have made every reasonable attempt to correct obvious errors, but I expect that there are errors of grammar and possibly content that I did not discover before finalizing the note.

## 2022-02-28 DIAGNOSIS — E11.9 TYPE 2 DIABETES MELLITUS WITHOUT COMPLICATION, WITHOUT LONG-TERM CURRENT USE OF INSULIN (HCC): ICD-10-CM

## 2022-02-28 RX ORDER — LANCETS 30 GAUGE
EACH MISCELLANEOUS
Qty: 100 EACH | Refills: 0 | Status: SHIPPED | OUTPATIENT
Start: 2022-02-28 | End: 2022-05-31

## 2022-06-02 ENCOUNTER — PATIENT MESSAGE (OUTPATIENT)
Dept: MEDICAL GROUP | Facility: MEDICAL CENTER | Age: 32
End: 2022-06-02
Payer: COMMERCIAL

## 2022-07-27 ENCOUNTER — OFFICE VISIT (OUTPATIENT)
Dept: MEDICAL GROUP | Facility: MEDICAL CENTER | Age: 32
End: 2022-07-27
Payer: COMMERCIAL

## 2022-07-27 VITALS
BODY MASS INDEX: 44.32 KG/M2 | HEIGHT: 67 IN | DIASTOLIC BLOOD PRESSURE: 80 MMHG | OXYGEN SATURATION: 97 % | SYSTOLIC BLOOD PRESSURE: 124 MMHG | TEMPERATURE: 97.6 F | HEART RATE: 109 BPM | WEIGHT: 282.4 LBS

## 2022-07-27 DIAGNOSIS — Z30.09 FAMILY PLANNING: ICD-10-CM

## 2022-07-27 DIAGNOSIS — B35.1 ONYCHOMYCOSIS: ICD-10-CM

## 2022-07-27 DIAGNOSIS — E66.01 CLASS 3 SEVERE OBESITY DUE TO EXCESS CALORIES WITH SERIOUS COMORBIDITY AND BODY MASS INDEX (BMI) OF 40.0 TO 44.9 IN ADULT (HCC): ICD-10-CM

## 2022-07-27 DIAGNOSIS — E11.9 TYPE 2 DIABETES MELLITUS WITHOUT COMPLICATION, WITHOUT LONG-TERM CURRENT USE OF INSULIN (HCC): ICD-10-CM

## 2022-07-27 PROBLEM — E66.813 CLASS 3 SEVERE OBESITY DUE TO EXCESS CALORIES WITH SERIOUS COMORBIDITY AND BODY MASS INDEX (BMI) OF 40.0 TO 44.9 IN ADULT: Chronic | Status: ACTIVE | Noted: 2022-07-27

## 2022-07-27 PROBLEM — E66.813 CLASS 3 SEVERE OBESITY DUE TO EXCESS CALORIES WITH SERIOUS COMORBIDITY AND BODY MASS INDEX (BMI) OF 40.0 TO 44.9 IN ADULT: Status: ACTIVE | Noted: 2022-07-27

## 2022-07-27 PROCEDURE — 99214 OFFICE O/P EST MOD 30 MIN: CPT | Performed by: FAMILY MEDICINE

## 2022-07-27 PROCEDURE — 92250 FUNDUS PHOTOGRAPHY W/I&R: CPT | Mod: TC | Performed by: FAMILY MEDICINE

## 2022-07-27 NOTE — ASSESSMENT & PLAN NOTE
Patient to work on taking metformin more regularly and continue to improve diet.  We will follow-up within 1 months for lab review and to discuss next steps.  Monofilament performed today patient has good pulses and sensation.  Very dry skin and signs of atrophy.  Discussed moisturizer and Tinactin to treat this.

## 2022-07-27 NOTE — ASSESSMENT & PLAN NOTE
Significant fungal disease most prominent in the great toes.  Discussed with patient getting blood sugars under control and then we can start treatment for this.

## 2022-07-27 NOTE — PROGRESS NOTES
"Subjective:     CC: \"Diabetes follow up\"    HPI:   Jevon presents today with diabetes    Has been taking his metformin, but not regularly as he forgets in the morning or will take it at lunch. He has cut back on drinking soda, but is drinking diet soda.   He denies nausea, occasionally has diarrhea with certain foods when he takes his metformin.   He endorses urinary frequency, blurry vision- does wear glasses which helps. He reports numbness and tingling to his fifth digit.     Has been working on lifestyle changes such as working out and trying to get into the gym.     Vasectomy- patient has 2 children. Risks discussed with patients. Will refer to urology     Nail fungus- reports that he has had this since he was 17, dry, itchy feet    Problem   Onychomycosis   Class 3 Severe Obesity Due to Excess Calories With Serious Comorbidity and Body Mass Index (Bmi) of 40.0 to 44.9 in Adult ([facility])    7/27/2022: 282 pounds, 128 kg, 44.23 BMI     Type 2 Diabetes Mellitus Without Complication, Without Long-Term Current Use of Insulin ([facility])    12/23/2021: A1c 9.8         Current Outpatient Medications Ordered in Epic   Medication Sig Dispense Refill   • metFORMIN (GLUCOPHAGE) 500 MG Tab Take 2 Tablets by mouth 2 times a day with meals. 120 Tablet 3   • OneTouch Delica Lancets 33G Misc USE TO TEST BLOOD SUGAR ONCE DAILY EARLY MORNING BEFORE FIRST MEAL. 100 Each 1   • ONETOUCH ULTRA strip USE ONCE DAILY EARLY MORNING BEFORE FIRST MEAL TO TEST BLOOD SUGAR AS DIRECTED 100 Strip 2   • Blood Glucose Test Strips Use one One Touch Ultra 2 strip to test blood sugar once daily early morning before first meal. 100 Strip 0     No current Epic-ordered facility-administered medications on file.       Health Maintenance: Monofilament, retinal exam, lab orders    ROS:  ROS see HPI    Objective:     Exam:  /80   Pulse (!) 109   Temp 36.4 °C (97.6 °F) (Temporal)   Ht 1.702 m (5' 7\")   Wt (!) 128 kg (282 lb 6.4 oz)   SpO2 97%   BMI " 44.23 kg/m²  Body mass index is 44.23 kg/m².    Physical Exam  Vitals reviewed.   Constitutional:       General: He is not in acute distress.     Appearance: Normal appearance. He is obese.   HENT:      Head: Normocephalic and atraumatic.   Cardiovascular:      Rate and Rhythm: Normal rate and regular rhythm.      Pulses: Normal pulses.           Dorsalis pedis pulses are 2+ on the right side and 2+ on the left side.        Posterior tibial pulses are 2+ on the right side and 2+ on the left side.      Heart sounds: Normal heart sounds.   Pulmonary:      Effort: Pulmonary effort is normal.      Breath sounds: Normal breath sounds.   Feet:      Right foot:      Protective Sensation: 4 sites tested. 4 sites sensed.      Skin integrity: Callus and dry skin present.      Toenail Condition: Right toenails are abnormally thick. Fungal disease present.     Left foot:      Protective Sensation: 4 sites tested. 4 sites sensed.      Skin integrity: Callus and dry skin present.      Toenail Condition: Left toenails are abnormally thick. Fungal disease present.  Skin:     General: Skin is warm and dry.      Capillary Refill: Capillary refill takes less than 2 seconds.   Neurological:      Mental Status: He is alert. Mental status is at baseline.   Psychiatric:         Mood and Affect: Mood normal.         Behavior: Behavior normal.           Assessment & Plan:     31 y.o. male with the following -     Problem List Items Addressed This Visit     Type 2 diabetes mellitus without complication, without long-term current use of insulin (HCC) (Chronic)     Patient to work on taking metformin more regularly and continue to improve diet.  We will follow-up within 1 months for lab review and to discuss next steps.  Monofilament performed today patient has good pulses and sensation.  Very dry skin and signs of atrophy.  Discussed moisturizer and Tinactin to treat this.           Relevant Medications    metFORMIN (GLUCOPHAGE) 500 MG Tab     Other Relevant Orders    CBC WITHOUT DIFFERENTIAL    MICROALBUMIN CREAT RATIO URINE    Comp Metabolic Panel    Lipid Profile    HEMOGLOBIN A1C    Diabetic Monofilament LE Exam (Completed)    POCT Retinal Eye Exam    Class 3 severe obesity due to excess calories with serious comorbidity and body mass index (BMI) of 40.0 to 44.9 in adult (HCC) (Chronic)     Patient is working on improving diet and integrating exercise.  We will work on improving blood sugar control may consider semaglutide or SGLT2 to assist with weight loss and blood sugar control.           Relevant Medications    metFORMIN (GLUCOPHAGE) 500 MG Tab    Onychomycosis     Significant fungal disease most prominent in the great toes.  Discussed with patient getting blood sugars under control and then we can start treatment for this.             Other Visit Diagnoses     Family planning        Relevant Orders    Referral to Urology            Return in about 4 weeks (around 8/24/2022).    Please note that this dictation was created using voice recognition software. I have made every reasonable attempt to correct obvious errors, but I expect that there are errors of grammar and possibly content that I did not discover before finalizing the note.

## 2022-07-27 NOTE — ASSESSMENT & PLAN NOTE
Patient is working on improving diet and integrating exercise.  We will work on improving blood sugar control may consider semaglutide or SGLT2 to assist with weight loss and blood sugar control.

## 2022-09-26 ENCOUNTER — OFFICE VISIT (OUTPATIENT)
Dept: MEDICAL GROUP | Facility: MEDICAL CENTER | Age: 32
End: 2022-09-26
Payer: COMMERCIAL

## 2022-09-26 VITALS
SYSTOLIC BLOOD PRESSURE: 124 MMHG | HEART RATE: 100 BPM | BODY MASS INDEX: 43.47 KG/M2 | DIASTOLIC BLOOD PRESSURE: 74 MMHG | TEMPERATURE: 97.8 F | OXYGEN SATURATION: 97 % | HEIGHT: 67 IN | WEIGHT: 277 LBS | RESPIRATION RATE: 16 BRPM

## 2022-09-26 DIAGNOSIS — R42 DIZZY: ICD-10-CM

## 2022-09-26 DIAGNOSIS — E66.01 MORBID OBESITY WITH BMI OF 40.0-44.9, ADULT (HCC): ICD-10-CM

## 2022-09-26 DIAGNOSIS — R09.81 NASAL SINUS CONGESTION: ICD-10-CM

## 2022-09-26 DIAGNOSIS — E11.9 TYPE 2 DIABETES MELLITUS WITHOUT COMPLICATION, WITHOUT LONG-TERM CURRENT USE OF INSULIN (HCC): ICD-10-CM

## 2022-09-26 DIAGNOSIS — Z23 NEED FOR VACCINATION: ICD-10-CM

## 2022-09-26 PROCEDURE — 90471 IMMUNIZATION ADMIN: CPT

## 2022-09-26 PROCEDURE — 90686 IIV4 VACC NO PRSV 0.5 ML IM: CPT

## 2022-09-26 PROCEDURE — 90677 PCV20 VACCINE IM: CPT

## 2022-09-26 PROCEDURE — 99213 OFFICE O/P EST LOW 20 MIN: CPT | Mod: 25

## 2022-09-26 PROCEDURE — 90472 IMMUNIZATION ADMIN EACH ADD: CPT

## 2022-09-26 RX ORDER — MECLIZINE HCL 12.5 MG/1
12.5 TABLET ORAL 3 TIMES DAILY PRN
Qty: 30 TABLET | Refills: 1 | Status: SHIPPED | OUTPATIENT
Start: 2022-09-26

## 2022-09-26 RX ORDER — BLOOD SUGAR DIAGNOSTIC
STRIP MISCELLANEOUS
Qty: 100 STRIP | Refills: 3 | Status: SHIPPED | OUTPATIENT
Start: 2022-09-26

## 2022-09-26 RX ORDER — METFORMIN HYDROCHLORIDE 500 MG/1
1000 TABLET, EXTENDED RELEASE ORAL 2 TIMES DAILY
Qty: 360 TABLET | Refills: 3 | Status: SHIPPED | OUTPATIENT
Start: 2022-09-26 | End: 2023-03-17 | Stop reason: SDUPTHER

## 2022-09-26 ASSESSMENT — ENCOUNTER SYMPTOMS
PALPITATIONS: 0
ORTHOPNEA: 0
COUGH: 0
CHILLS: 0
FEVER: 0
SHORTNESS OF BREATH: 0

## 2022-09-26 NOTE — PROGRESS NOTES
"Subjective:     HPI:   Jevon presents today with dizziness.    Allergies: Patient has no known allergies.     Medications:   Current Outpatient Medications:     glucose blood (ONETOUCH ULTRA) strip, USE ONCE DAILY EARLY MORNING BEFORE FIRST MEAL TO TEST BLOOD SUGAR AS DIRECTED, Disp: 100 Strip, Rfl: 3    metFORMIN ER (GLUCOPHAGE XR) 500 MG TABLET SR 24 HR, Take 2 Tablets by mouth 2 times a day., Disp: 360 Tablet, Rfl: 3    meclizine (ANTIVERT) 12.5 MG Tab, Take 1 Tablet by mouth 3 times a day as needed for Dizziness., Disp: 30 Tablet, Rfl: 1    OneTouch Delica Lancets 33G Misc, USE TO TEST BLOOD SUGAR ONCE DAILY EARLY MORNING BEFORE FIRST MEAL., Disp: 100 Each, Rfl: 1    Blood Glucose Test Strips, Use one One Touch Ultra 2 strip to test blood sugar once daily early morning before first meal., Disp: 100 Strip, Rfl: 0      ROS:  Review of Systems   Constitutional:  Negative for chills and fever.   Respiratory:  Negative for cough and shortness of breath.    Cardiovascular:  Negative for chest pain, palpitations, orthopnea and leg swelling.     Objective:     Exam:  /74   Pulse 100   Temp 36.6 °C (97.8 °F)   Resp 16   Ht 1.702 m (5' 7\")   Wt (!) 126 kg (277 lb)   SpO2 97%   BMI 43.38 kg/m²  Body mass index is 43.38 kg/m².    Physical Exam  Constitutional:       Appearance: He is normal weight.   HENT:      Right Ear: Tympanic membrane, ear canal and external ear normal.      Left Ear: Tympanic membrane, ear canal and external ear normal.   Eyes:      Pupils: Pupils are equal, round, and reactive to light.   Cardiovascular:      Rate and Rhythm: Normal rate and regular rhythm.      Pulses: Normal pulses.      Heart sounds: Normal heart sounds.   Pulmonary:      Effort: Pulmonary effort is normal.      Breath sounds: Normal breath sounds.   Neurological:      Mental Status: He is alert and oriented to person, place, and time.   Psychiatric:         Mood and Affect: Mood normal.         Behavior: Behavior " normal.       Assessment & Plan:     32 y.o. male with the following -     1. Dizzy  Acute, uncomplicated  Patient states that he has noticed dizziness for the last 2 days.  He states it happens when he turns his head.  Patient denies drinking adequate amounts of water.  He states that he has been drinking sugar-free rockstar's and sugar-free Dr. Pepper most of the time.  Recommended cutting back on this and focusing primarily on water to ensure that he is adequately hydrated.  Discussed trying the Epley maneuver to see if that improves his symptoms as well as using meclizine.  Recommended follow-up in 2 weeks if symptoms do not improve or get worse.  We will consider referral to physical therapy for vestibular therapy if symptoms do not improve.  - meclizine (ANTIVERT) 12.5 MG Tab; Take 1 Tablet by mouth 3 times a day as needed for Dizziness.  Dispense: 30 Tablet; Refill: 1    2. Type 2 diabetes mellitus without complication, without long-term current use of insulin (HCC)  Chronic, unstable  Patient states that he has not been taking his metformin as prescribed.  He stopped taking it for about a week due to some GI upset.  He states that sometimes he will take 1 tablet instead of 2 if he is not eating very much food.  Recently started taking it approximately 1-2 weeks ago, but started taking it again.  He states last time he checked his sugar was yesterday and it was 171 approximately 2 hours after eating pizza.  States he does not check his sugar daily because he does not have enough test strips, will reorder.   Discussed with patient the importance of taking the medications as prescribed to help lower his blood sugar as his A1c was 9.8 in December 2021.  Patient has labs that were ordered in July of this year, instructed patient to get those done prior to next appointment.  Metformin changed to extended release to see if that helps GI upset.  - glucose blood (ONETOUCH ULTRA) strip; USE ONCE DAILY EARLY MORNING  BEFORE FIRST MEAL TO TEST BLOOD SUGAR AS DIRECTED  Dispense: 100 Strip; Refill: 3  - metFORMIN ER (GLUCOPHAGE XR) 500 MG TABLET SR 24 HR; Take 2 Tablets by mouth 2 times a day.  Dispense: 360 Tablet; Refill:3    3.  Nasal sinus congestion  Acute, uncomplicated  Patient reports that he has been having nasal congestion for the last couple weeks.  He denies tenderness on either side.  Recommended trying over-the-counter Zyrtec and Nasacort to see if that improves symptoms.    4. Morbid obesity with BMI of 40.0-44.9, adult (HCC)  Chronic, stable  Dietary and lifestyle modifications discussed with patient  - Patient identified as having weight management issue.  Appropriate orders and counseling given.    5. Need for vaccination  - INFLUENZA VACCINE QUAD INJ (PF)  - Pneumococcal Conjugate Vaccine 20-Valent (19 yrs+    Anticipatory guidance included the following: Patient counseled about skin care, diet, supplements, drugs/alcohol use, safe sex and exercise.     Return in about 2 weeks (around 10/10/2022), or if symptoms worsen or fail to improve.    Please note that this dictation was created using voice recognition software. I have made every reasonable attempt to correct obvious errors, but I expect that there are errors of grammar and possibly content that I did not discover before finalizing the note.

## 2022-10-03 ENCOUNTER — HOSPITAL ENCOUNTER (OUTPATIENT)
Dept: LAB | Facility: MEDICAL CENTER | Age: 32
End: 2022-10-03
Attending: FAMILY MEDICINE
Payer: COMMERCIAL

## 2022-10-03 DIAGNOSIS — E11.9 TYPE 2 DIABETES MELLITUS WITHOUT COMPLICATION, WITHOUT LONG-TERM CURRENT USE OF INSULIN (HCC): ICD-10-CM

## 2022-10-03 LAB
ALBUMIN SERPL BCP-MCNC: 5 G/DL (ref 3.2–4.9)
ALBUMIN/GLOB SERPL: 1.4 G/DL
ALP SERPL-CCNC: 138 U/L (ref 30–99)
ALT SERPL-CCNC: 49 U/L (ref 2–50)
ANION GAP SERPL CALC-SCNC: 12 MMOL/L (ref 7–16)
AST SERPL-CCNC: 30 U/L (ref 12–45)
BILIRUB SERPL-MCNC: 0.3 MG/DL (ref 0.1–1.5)
BUN SERPL-MCNC: 8 MG/DL (ref 8–22)
CALCIUM SERPL-MCNC: 9.6 MG/DL (ref 8.5–10.5)
CHLORIDE SERPL-SCNC: 101 MMOL/L (ref 96–112)
CHOLEST SERPL-MCNC: 179 MG/DL (ref 100–199)
CO2 SERPL-SCNC: 23 MMOL/L (ref 20–33)
CREAT SERPL-MCNC: 0.81 MG/DL (ref 0.5–1.4)
CREAT UR-MCNC: 49.06 MG/DL
ERYTHROCYTE [DISTWIDTH] IN BLOOD BY AUTOMATED COUNT: 43.4 FL (ref 35.9–50)
EST. AVERAGE GLUCOSE BLD GHB EST-MCNC: 143 MG/DL
GFR SERPLBLD CREATININE-BSD FMLA CKD-EPI: 121 ML/MIN/1.73 M 2
GLOBULIN SER CALC-MCNC: 3.6 G/DL (ref 1.9–3.5)
GLUCOSE SERPL-MCNC: 95 MG/DL (ref 65–99)
HBA1C MFR BLD: 6.6 % (ref 4–5.6)
HCT VFR BLD AUTO: 46.2 % (ref 42–52)
HDLC SERPL-MCNC: 32 MG/DL
HGB BLD-MCNC: 15.2 G/DL (ref 14–18)
LDLC SERPL CALC-MCNC: 111 MG/DL
MCH RBC QN AUTO: 26.6 PG (ref 27–33)
MCHC RBC AUTO-ENTMCNC: 32.9 G/DL (ref 33.7–35.3)
MCV RBC AUTO: 80.8 FL (ref 81.4–97.8)
MICROALBUMIN UR-MCNC: <1.2 MG/DL
MICROALBUMIN/CREAT UR: NORMAL MG/G (ref 0–30)
PLATELET # BLD AUTO: 226 K/UL (ref 164–446)
PMV BLD AUTO: 12.3 FL (ref 9–12.9)
POTASSIUM SERPL-SCNC: 3.9 MMOL/L (ref 3.6–5.5)
PROT SERPL-MCNC: 8.6 G/DL (ref 6–8.2)
RBC # BLD AUTO: 5.72 M/UL (ref 4.7–6.1)
SODIUM SERPL-SCNC: 136 MMOL/L (ref 135–145)
TRIGL SERPL-MCNC: 182 MG/DL (ref 0–149)
WBC # BLD AUTO: 11.8 K/UL (ref 4.8–10.8)

## 2022-10-03 PROCEDURE — 36415 COLL VENOUS BLD VENIPUNCTURE: CPT

## 2022-10-03 PROCEDURE — 82570 ASSAY OF URINE CREATININE: CPT

## 2022-10-03 PROCEDURE — 83036 HEMOGLOBIN GLYCOSYLATED A1C: CPT

## 2022-10-03 PROCEDURE — 80061 LIPID PANEL: CPT

## 2022-10-03 PROCEDURE — 80053 COMPREHEN METABOLIC PANEL: CPT

## 2022-10-03 PROCEDURE — 85027 COMPLETE CBC AUTOMATED: CPT

## 2022-10-03 PROCEDURE — 82043 UR ALBUMIN QUANTITATIVE: CPT

## 2022-10-05 ENCOUNTER — PATIENT MESSAGE (OUTPATIENT)
Dept: MEDICAL GROUP | Facility: MEDICAL CENTER | Age: 32
End: 2022-10-05
Payer: COMMERCIAL

## 2022-10-05 DIAGNOSIS — B35.1 ONYCHOMYCOSIS: ICD-10-CM

## 2022-10-05 RX ORDER — TERBINAFINE HYDROCHLORIDE 250 MG/1
250 TABLET ORAL DAILY
Qty: 30 TABLET | Refills: 2 | Status: SHIPPED | OUTPATIENT
Start: 2022-10-05 | End: 2023-03-17

## 2022-10-05 NOTE — PROGRESS NOTES
Patient's blood sugars under much better control.  Liver enzymes in appropriate range.  We will start terbinafine to treat onychomycosis.  Plan to recheck liver enzymes in 6 weeks.

## 2023-02-15 ENCOUNTER — PATIENT MESSAGE (OUTPATIENT)
Dept: MEDICAL GROUP | Facility: MEDICAL CENTER | Age: 33
End: 2023-02-15
Payer: COMMERCIAL

## 2023-02-15 DIAGNOSIS — E11.9 TYPE 2 DIABETES MELLITUS WITHOUT COMPLICATION, WITHOUT LONG-TERM CURRENT USE OF INSULIN (HCC): Chronic | ICD-10-CM

## 2023-02-15 DIAGNOSIS — E66.01 MORBID OBESITY WITH BMI OF 40.0-44.9, ADULT (HCC): ICD-10-CM

## 2023-03-06 ENCOUNTER — HOSPITAL ENCOUNTER (OUTPATIENT)
Dept: LAB | Facility: MEDICAL CENTER | Age: 33
End: 2023-03-06
Attending: FAMILY MEDICINE
Payer: COMMERCIAL

## 2023-03-06 DIAGNOSIS — E11.9 TYPE 2 DIABETES MELLITUS WITHOUT COMPLICATION, WITHOUT LONG-TERM CURRENT USE OF INSULIN (HCC): Chronic | ICD-10-CM

## 2023-03-06 DIAGNOSIS — B35.1 ONYCHOMYCOSIS: ICD-10-CM

## 2023-03-06 DIAGNOSIS — E66.01 MORBID OBESITY WITH BMI OF 40.0-44.9, ADULT (HCC): ICD-10-CM

## 2023-03-06 LAB
ALBUMIN SERPL BCP-MCNC: 4.6 G/DL (ref 3.2–4.9)
ALBUMIN/GLOB SERPL: 1.2 G/DL
ALP SERPL-CCNC: 134 U/L (ref 30–99)
ALT SERPL-CCNC: 54 U/L (ref 2–50)
ANION GAP SERPL CALC-SCNC: 13 MMOL/L (ref 7–16)
AST SERPL-CCNC: 32 U/L (ref 12–45)
BILIRUB SERPL-MCNC: 0.4 MG/DL (ref 0.1–1.5)
BUN SERPL-MCNC: 8 MG/DL (ref 8–22)
CALCIUM ALBUM COR SERPL-MCNC: 9.1 MG/DL (ref 8.5–10.5)
CALCIUM SERPL-MCNC: 9.6 MG/DL (ref 8.5–10.5)
CHLORIDE SERPL-SCNC: 100 MMOL/L (ref 96–112)
CO2 SERPL-SCNC: 23 MMOL/L (ref 20–33)
CREAT SERPL-MCNC: 0.71 MG/DL (ref 0.5–1.4)
EST. AVERAGE GLUCOSE BLD GHB EST-MCNC: 131 MG/DL
GFR SERPLBLD CREATININE-BSD FMLA CKD-EPI: 125 ML/MIN/1.73 M 2
GLOBULIN SER CALC-MCNC: 3.8 G/DL (ref 1.9–3.5)
GLUCOSE SERPL-MCNC: 95 MG/DL (ref 65–99)
HBA1C MFR BLD: 6.2 % (ref 4–5.6)
POTASSIUM SERPL-SCNC: 4 MMOL/L (ref 3.6–5.5)
PROT SERPL-MCNC: 8.4 G/DL (ref 6–8.2)
SODIUM SERPL-SCNC: 136 MMOL/L (ref 135–145)
TSH SERPL DL<=0.005 MIU/L-ACNC: 1.35 UIU/ML (ref 0.38–5.33)

## 2023-03-06 PROCEDURE — 36415 COLL VENOUS BLD VENIPUNCTURE: CPT

## 2023-03-06 PROCEDURE — 80053 COMPREHEN METABOLIC PANEL: CPT

## 2023-03-06 PROCEDURE — 83036 HEMOGLOBIN GLYCOSYLATED A1C: CPT

## 2023-03-06 PROCEDURE — 84443 ASSAY THYROID STIM HORMONE: CPT

## 2023-03-17 ENCOUNTER — OFFICE VISIT (OUTPATIENT)
Dept: MEDICAL GROUP | Facility: MEDICAL CENTER | Age: 33
End: 2023-03-17
Payer: COMMERCIAL

## 2023-03-17 VITALS
WEIGHT: 275.2 LBS | OXYGEN SATURATION: 99 % | TEMPERATURE: 97.4 F | HEART RATE: 97 BPM | DIASTOLIC BLOOD PRESSURE: 76 MMHG | SYSTOLIC BLOOD PRESSURE: 122 MMHG | BODY MASS INDEX: 43.1 KG/M2

## 2023-03-17 DIAGNOSIS — E66.01 CLASS 3 SEVERE OBESITY DUE TO EXCESS CALORIES WITH SERIOUS COMORBIDITY AND BODY MASS INDEX (BMI) OF 40.0 TO 44.9 IN ADULT (HCC): Chronic | ICD-10-CM

## 2023-03-17 DIAGNOSIS — R74.8 ELEVATED LIVER ENZYMES: ICD-10-CM

## 2023-03-17 DIAGNOSIS — E11.9 TYPE 2 DIABETES MELLITUS WITHOUT COMPLICATION, WITHOUT LONG-TERM CURRENT USE OF INSULIN (HCC): Chronic | ICD-10-CM

## 2023-03-17 DIAGNOSIS — B35.1 ONYCHOMYCOSIS: ICD-10-CM

## 2023-03-17 PROBLEM — M54.6 ACUTE RIGHT-SIDED THORACIC BACK PAIN: Status: RESOLVED | Noted: 2022-02-25 | Resolved: 2023-03-17

## 2023-03-17 PROCEDURE — 99214 OFFICE O/P EST MOD 30 MIN: CPT | Performed by: FAMILY MEDICINE

## 2023-03-17 RX ORDER — METFORMIN HYDROCHLORIDE 500 MG/1
500 TABLET, EXTENDED RELEASE ORAL 2 TIMES DAILY
Qty: 180 TABLET | Refills: 3 | Status: SHIPPED | OUTPATIENT
Start: 2023-03-17

## 2023-03-17 ASSESSMENT — FIBROSIS 4 INDEX: FIB4 SCORE: 0.6

## 2023-03-17 ASSESSMENT — PATIENT HEALTH QUESTIONNAIRE - PHQ9: CLINICAL INTERPRETATION OF PHQ2 SCORE: 0

## 2023-03-17 NOTE — ASSESSMENT & PLAN NOTE
ALT and Alk Phos mildly elevated   Reviewed past imaging from 5 years ago and no concerning findings with liver but of limited utility given time span  Discussed with patient suspect fatty liver disease  This is even more impetus to lose weight and improve diet, discussed dangers of processed foods, sodas and alcohol. Will plan on checking again this fall, sooner if need be

## 2023-03-17 NOTE — ASSESSMENT & PLAN NOTE
Chronic problem  Patient making good diet changes for glycemic control  Encouraged to work on weight loss, difficult as partner not ready to make as many changes  Encouraged daily activity

## 2023-03-17 NOTE — PROGRESS NOTES
"Subjective:     CC: \"lab follow up\"    HPI:   Jevon presents today with:    Sometimes gets a little spasm in upper left quadrant, not overly bothersome not other complaints  He has worked on improving his diet and does not always take his metformin, he did lose some while traveling  He notes that certain foods does make his sugars go up on his home meter  He is a little concerned about his elevated ALT  Did not take the terbinafine due to concern for side effects  Though he is improving diet has not made a lot of other changes as he wants to do this with his partner but they are not as committed to lifestyle changes yet        Problem   Elevated Liver Enzymes   Onychomycosis   Class 3 Severe Obesity Due to Excess Calories With Serious Comorbidity and Body Mass Index (Bmi) of 40.0 to 44.9 in Adult (Hcc)   Type 2 Diabetes Mellitus Without Complication, Without Long-Term Current Use of Insulin (Hcc)   Morbid Obesity With Bmi of 40.0-44.9, Adult (Hcc) (Resolved)   Acute Right-Sided Thoracic Back Pain (Resolved)    Patient tells me he is waking up with right-sided mid back pain.  He thinks it might have been his bed.  Is been going on for 3 weeks.  It does not radiate.  But it is uncomfortable and takes a while to get out of bed.  He does not tell me that it gets better once he starts moving around after a while.  No known trauma.         Current Outpatient Medications Ordered in Epic   Medication Sig Dispense Refill    Efinaconazole 10 % Solution Apply to affected toenail(s) once daily for 48 weeks; ensure complete coverage of the toenail, the toenail folds, toenail bed, surrounding skin, and the undersurface of the toenail plate 8 mL 11    metFORMIN ER (GLUCOPHAGE XR) 500 MG TABLET SR 24 HR Take 1 Tablet by mouth 2 times a day. 180 Tablet 3    glucose blood (ONETOUCH ULTRA) strip USE ONCE DAILY EARLY MORNING BEFORE FIRST MEAL TO TEST BLOOD SUGAR AS DIRECTED 100 Strip 3    meclizine (ANTIVERT) 12.5 MG Tab Take 1 Tablet " by mouth 3 times a day as needed for Dizziness. 30 Tablet 1    OneTouch Delica Lancets 33G Misc USE TO TEST BLOOD SUGAR ONCE DAILY EARLY MORNING BEFORE FIRST MEAL. 100 Each 1    Blood Glucose Test Strips Use one One Touch Ultra 2 strip to test blood sugar once daily early morning before first meal. 100 Strip 0     No current Epic-ordered facility-administered medications on file.       Health Maintenance: Completed    ROS:  ROS see HPI    Objective:     Exam:  /76 (BP Location: Right arm, Patient Position: Sitting, BP Cuff Size: Large adult)   Pulse 97   Temp 36.3 °C (97.4 °F) (Temporal)   Wt 125 kg (275 lb 3.2 oz)   SpO2 99%   BMI 43.10 kg/m²  Body mass index is 43.1 kg/m².    Physical Exam  Vitals reviewed.   Constitutional:       General: He is not in acute distress.     Appearance: Normal appearance. He is obese.   HENT:      Head: Normocephalic and atraumatic.   Eyes:      Conjunctiva/sclera: Conjunctivae normal.   Cardiovascular:      Rate and Rhythm: Normal rate and regular rhythm.      Heart sounds: Normal heart sounds.   Pulmonary:      Effort: Pulmonary effort is normal. No respiratory distress.      Breath sounds: Normal breath sounds.   Abdominal:      General: There is no distension.      Palpations: Abdomen is soft.      Tenderness: There is no abdominal tenderness. There is no guarding.   Skin:     General: Skin is warm and dry.      Coloration: Skin is not jaundiced.   Neurological:      Mental Status: He is alert. Mental status is at baseline.   Psychiatric:         Mood and Affect: Mood normal.         Behavior: Behavior normal.     Labs:          Imaging  CT Renal Colic 10/19/18  The liver, spleen, pancreas, and adrenal glands are normal in noncontrast appearance.    Assessment & Plan:     32 y.o. male with the following -     Problem List Items Addressed This Visit       Type 2 diabetes mellitus without complication, without long-term current use of insulin (HCC) (Chronic)     Good  improvement with blood sugar control  Encouraged patient to continue with health diet modifications  Discussed that he probably still needs some Metformin to help with glucose control but can try going down to 500 mg BID. Will check A1C in 3 months.         Relevant Medications    metFORMIN ER (GLUCOPHAGE XR) 500 MG TABLET SR 24 HR    Class 3 severe obesity due to excess calories with serious comorbidity and body mass index (BMI) of 40.0 to 44.9 in adult (HCC) (Chronic)     Chronic problem  Patient making good diet changes for glycemic control  Encouraged to work on weight loss, difficult as partner not ready to make as many changes  Encouraged daily activity         Relevant Medications    metFORMIN ER (GLUCOPHAGE XR) 500 MG TABLET SR 24 HR    Onychomycosis     Concerned about side effects regarding terbinafine, does have mildly elevated ALT  Will try topical medicne         Relevant Medications    Efinaconazole 10 % Solution    Elevated liver enzymes     ALT and Alk Phos mildly elevated   Reviewed past imaging from 5 years ago and no concerning findings with liver but of limited utility given time span  Discussed with patient suspect fatty liver disease  This is even more impetus to lose weight and improve diet, discussed dangers of processed foods, sodas and alcohol. Will plan on checking again this fall, sooner if need be                Return in about 3 months (around 6/17/2023) for Diabetes F/U, Medication F/U.    Please note that this dictation was created using voice recognition software. I have made every reasonable attempt to correct obvious errors, but I expect that there are errors of grammar and possibly content that I did not discover before finalizing the note.

## 2023-03-17 NOTE — ASSESSMENT & PLAN NOTE
Concerned about side effects regarding terbinafine, does have mildly elevated ALT  Will try topical medicne

## 2023-03-17 NOTE — ASSESSMENT & PLAN NOTE
Good improvement with blood sugar control  Encouraged patient to continue with health diet modifications  Discussed that he probably still needs some Metformin to help with glucose control but can try going down to 500 mg BID. Will check A1C in 3 months.

## 2023-06-18 SDOH — ECONOMIC STABILITY: INCOME INSECURITY: IN THE LAST 12 MONTHS, WAS THERE A TIME WHEN YOU WERE NOT ABLE TO PAY THE MORTGAGE OR RENT ON TIME?: NO

## 2023-06-18 SDOH — ECONOMIC STABILITY: HOUSING INSECURITY
IN THE LAST 12 MONTHS, WAS THERE A TIME WHEN YOU DID NOT HAVE A STEADY PLACE TO SLEEP OR SLEPT IN A SHELTER (INCLUDING NOW)?: NO

## 2023-06-18 SDOH — HEALTH STABILITY: PHYSICAL HEALTH: ON AVERAGE, HOW MANY DAYS PER WEEK DO YOU ENGAGE IN MODERATE TO STRENUOUS EXERCISE (LIKE A BRISK WALK)?: 5 DAYS

## 2023-06-18 SDOH — HEALTH STABILITY: PHYSICAL HEALTH: ON AVERAGE, HOW MANY MINUTES DO YOU ENGAGE IN EXERCISE AT THIS LEVEL?: 10 MIN

## 2023-06-18 SDOH — ECONOMIC STABILITY: INCOME INSECURITY: HOW HARD IS IT FOR YOU TO PAY FOR THE VERY BASICS LIKE FOOD, HOUSING, MEDICAL CARE, AND HEATING?: NOT VERY HARD

## 2023-06-18 SDOH — ECONOMIC STABILITY: TRANSPORTATION INSECURITY
IN THE PAST 12 MONTHS, HAS THE LACK OF TRANSPORTATION KEPT YOU FROM MEDICAL APPOINTMENTS OR FROM GETTING MEDICATIONS?: NO

## 2023-06-18 SDOH — ECONOMIC STABILITY: FOOD INSECURITY: WITHIN THE PAST 12 MONTHS, THE FOOD YOU BOUGHT JUST DIDN'T LAST AND YOU DIDN'T HAVE MONEY TO GET MORE.: NEVER TRUE

## 2023-06-18 SDOH — ECONOMIC STABILITY: FOOD INSECURITY: WITHIN THE PAST 12 MONTHS, YOU WORRIED THAT YOUR FOOD WOULD RUN OUT BEFORE YOU GOT MONEY TO BUY MORE.: NEVER TRUE

## 2023-06-18 SDOH — HEALTH STABILITY: MENTAL HEALTH
STRESS IS WHEN SOMEONE FEELS TENSE, NERVOUS, ANXIOUS, OR CAN'T SLEEP AT NIGHT BECAUSE THEIR MIND IS TROUBLED. HOW STRESSED ARE YOU?: ONLY A LITTLE

## 2023-06-18 SDOH — ECONOMIC STABILITY: TRANSPORTATION INSECURITY
IN THE PAST 12 MONTHS, HAS LACK OF TRANSPORTATION KEPT YOU FROM MEETINGS, WORK, OR FROM GETTING THINGS NEEDED FOR DAILY LIVING?: NO

## 2023-06-18 SDOH — ECONOMIC STABILITY: TRANSPORTATION INSECURITY
IN THE PAST 12 MONTHS, HAS LACK OF RELIABLE TRANSPORTATION KEPT YOU FROM MEDICAL APPOINTMENTS, MEETINGS, WORK OR FROM GETTING THINGS NEEDED FOR DAILY LIVING?: NO

## 2023-06-18 SDOH — ECONOMIC STABILITY: HOUSING INSECURITY

## 2023-06-18 ASSESSMENT — SOCIAL DETERMINANTS OF HEALTH (SDOH)
HOW OFTEN DO YOU HAVE A DRINK CONTAINING ALCOHOL: MONTHLY OR LESS
HOW OFTEN DO YOU ATTENT MEETINGS OF THE CLUB OR ORGANIZATION YOU BELONG TO?: NEVER
DO YOU BELONG TO ANY CLUBS OR ORGANIZATIONS SUCH AS CHURCH GROUPS UNIONS, FRATERNAL OR ATHLETIC GROUPS, OR SCHOOL GROUPS?: NO
HOW HARD IS IT FOR YOU TO PAY FOR THE VERY BASICS LIKE FOOD, HOUSING, MEDICAL CARE, AND HEATING?: NOT VERY HARD
ARE YOU MARRIED, WIDOWED, DIVORCED, SEPARATED, NEVER MARRIED, OR LIVING WITH A PARTNER?: LIVING WITH PARTNER
HOW MANY DRINKS CONTAINING ALCOHOL DO YOU HAVE ON A TYPICAL DAY WHEN YOU ARE DRINKING: 3 OR 4
WITHIN THE PAST 12 MONTHS, YOU WORRIED THAT YOUR FOOD WOULD RUN OUT BEFORE YOU GOT THE MONEY TO BUY MORE: NEVER TRUE
HOW OFTEN DO YOU ATTEND CHURCH OR RELIGIOUS SERVICES?: NEVER
HOW OFTEN DO YOU GET TOGETHER WITH FRIENDS OR RELATIVES?: ONCE A WEEK
HOW OFTEN DO YOU HAVE SIX OR MORE DRINKS ON ONE OCCASION: NEVER
ARE YOU MARRIED, WIDOWED, DIVORCED, SEPARATED, NEVER MARRIED, OR LIVING WITH A PARTNER?: LIVING WITH PARTNER
HOW OFTEN DO YOU ATTEND CHURCH OR RELIGIOUS SERVICES?: NEVER
HOW OFTEN DO YOU ATTENT MEETINGS OF THE CLUB OR ORGANIZATION YOU BELONG TO?: NEVER
IN A TYPICAL WEEK, HOW MANY TIMES DO YOU TALK ON THE PHONE WITH FAMILY, FRIENDS, OR NEIGHBORS?: NEVER
DO YOU BELONG TO ANY CLUBS OR ORGANIZATIONS SUCH AS CHURCH GROUPS UNIONS, FRATERNAL OR ATHLETIC GROUPS, OR SCHOOL GROUPS?: NO
HOW OFTEN DO YOU GET TOGETHER WITH FRIENDS OR RELATIVES?: ONCE A WEEK
IN A TYPICAL WEEK, HOW MANY TIMES DO YOU TALK ON THE PHONE WITH FAMILY, FRIENDS, OR NEIGHBORS?: NEVER

## 2023-06-18 ASSESSMENT — LIFESTYLE VARIABLES
HOW MANY STANDARD DRINKS CONTAINING ALCOHOL DO YOU HAVE ON A TYPICAL DAY: 3 OR 4
SKIP TO QUESTIONS 9-10: 0
HOW OFTEN DO YOU HAVE SIX OR MORE DRINKS ON ONE OCCASION: NEVER
AUDIT-C TOTAL SCORE: 2
HOW OFTEN DO YOU HAVE A DRINK CONTAINING ALCOHOL: MONTHLY OR LESS

## 2023-06-21 ENCOUNTER — HOSPITAL ENCOUNTER (OUTPATIENT)
Dept: RADIOLOGY | Facility: MEDICAL CENTER | Age: 33
End: 2023-06-21
Attending: FAMILY MEDICINE
Payer: COMMERCIAL

## 2023-06-21 ENCOUNTER — HOSPITAL ENCOUNTER (OUTPATIENT)
Dept: LAB | Facility: MEDICAL CENTER | Age: 33
End: 2023-06-21
Attending: FAMILY MEDICINE
Payer: COMMERCIAL

## 2023-06-21 ENCOUNTER — OFFICE VISIT (OUTPATIENT)
Dept: MEDICAL GROUP | Facility: MEDICAL CENTER | Age: 33
End: 2023-06-21
Payer: COMMERCIAL

## 2023-06-21 VITALS
HEIGHT: 67 IN | TEMPERATURE: 97.9 F | BODY MASS INDEX: 43.98 KG/M2 | WEIGHT: 280.2 LBS | OXYGEN SATURATION: 96 % | HEART RATE: 93 BPM | SYSTOLIC BLOOD PRESSURE: 126 MMHG | DIASTOLIC BLOOD PRESSURE: 82 MMHG

## 2023-06-21 DIAGNOSIS — R05.2 SUBACUTE COUGH: ICD-10-CM

## 2023-06-21 DIAGNOSIS — R04.2 BLOOD-TINGED SPUTUM: ICD-10-CM

## 2023-06-21 LAB
ALBUMIN SERPL BCP-MCNC: 4.6 G/DL (ref 3.2–4.9)
ALBUMIN/GLOB SERPL: 1.3 G/DL
ALP SERPL-CCNC: 147 U/L (ref 30–99)
ALT SERPL-CCNC: 32 U/L (ref 2–50)
ANION GAP SERPL CALC-SCNC: 12 MMOL/L (ref 7–16)
AST SERPL-CCNC: 20 U/L (ref 12–45)
BASOPHILS # BLD AUTO: 0.4 % (ref 0–1.8)
BASOPHILS # BLD: 0.05 K/UL (ref 0–0.12)
BILIRUB SERPL-MCNC: 0.3 MG/DL (ref 0.1–1.5)
BUN SERPL-MCNC: 8 MG/DL (ref 8–22)
CALCIUM ALBUM COR SERPL-MCNC: 9.3 MG/DL (ref 8.5–10.5)
CALCIUM SERPL-MCNC: 9.8 MG/DL (ref 8.5–10.5)
CHLORIDE SERPL-SCNC: 102 MMOL/L (ref 96–112)
CO2 SERPL-SCNC: 22 MMOL/L (ref 20–33)
CREAT SERPL-MCNC: 0.71 MG/DL (ref 0.5–1.4)
CRP SERPL HS-MCNC: 0.93 MG/DL (ref 0–0.75)
EOSINOPHIL # BLD AUTO: 0.26 K/UL (ref 0–0.51)
EOSINOPHIL NFR BLD: 2.1 % (ref 0–6.9)
ERYTHROCYTE [DISTWIDTH] IN BLOOD BY AUTOMATED COUNT: 42.5 FL (ref 35.9–50)
GFR SERPLBLD CREATININE-BSD FMLA CKD-EPI: 125 ML/MIN/1.73 M 2
GLOBULIN SER CALC-MCNC: 3.6 G/DL (ref 1.9–3.5)
GLUCOSE SERPL-MCNC: 123 MG/DL (ref 65–99)
HCT VFR BLD AUTO: 46.9 % (ref 42–52)
HGB BLD-MCNC: 15 G/DL (ref 14–18)
IMM GRANULOCYTES # BLD AUTO: 0.07 K/UL (ref 0–0.11)
IMM GRANULOCYTES NFR BLD AUTO: 0.6 % (ref 0–0.9)
LYMPHOCYTES # BLD AUTO: 2.49 K/UL (ref 1–4.8)
LYMPHOCYTES NFR BLD: 20.5 % (ref 22–41)
MCH RBC QN AUTO: 25.7 PG (ref 27–33)
MCHC RBC AUTO-ENTMCNC: 32 G/DL (ref 32.3–36.5)
MCV RBC AUTO: 80.4 FL (ref 81.4–97.8)
MONOCYTES # BLD AUTO: 0.83 K/UL (ref 0–0.85)
MONOCYTES NFR BLD AUTO: 6.8 % (ref 0–13.4)
NEUTROPHILS # BLD AUTO: 8.46 K/UL (ref 1.82–7.42)
NEUTROPHILS NFR BLD: 69.6 % (ref 44–72)
NRBC # BLD AUTO: 0 K/UL
NRBC BLD-RTO: 0 /100 WBC (ref 0–0.2)
PLATELET # BLD AUTO: 244 K/UL (ref 164–446)
PMV BLD AUTO: 12.2 FL (ref 9–12.9)
POTASSIUM SERPL-SCNC: 3.8 MMOL/L (ref 3.6–5.5)
PROT SERPL-MCNC: 8.2 G/DL (ref 6–8.2)
RBC # BLD AUTO: 5.83 M/UL (ref 4.7–6.1)
SODIUM SERPL-SCNC: 136 MMOL/L (ref 135–145)
WBC # BLD AUTO: 12.2 K/UL (ref 4.8–10.8)

## 2023-06-21 PROCEDURE — 85025 COMPLETE CBC W/AUTO DIFF WBC: CPT

## 2023-06-21 PROCEDURE — 3079F DIAST BP 80-89 MM HG: CPT | Performed by: FAMILY MEDICINE

## 2023-06-21 PROCEDURE — 36415 COLL VENOUS BLD VENIPUNCTURE: CPT

## 2023-06-21 PROCEDURE — 86140 C-REACTIVE PROTEIN: CPT

## 2023-06-21 PROCEDURE — 99214 OFFICE O/P EST MOD 30 MIN: CPT | Performed by: FAMILY MEDICINE

## 2023-06-21 PROCEDURE — 71046 X-RAY EXAM CHEST 2 VIEWS: CPT

## 2023-06-21 PROCEDURE — 85652 RBC SED RATE AUTOMATED: CPT

## 2023-06-21 PROCEDURE — 3074F SYST BP LT 130 MM HG: CPT | Performed by: FAMILY MEDICINE

## 2023-06-21 PROCEDURE — 80053 COMPREHEN METABOLIC PANEL: CPT

## 2023-06-21 PROCEDURE — 86480 TB TEST CELL IMMUN MEASURE: CPT

## 2023-06-21 RX ORDER — FAMOTIDINE 20 MG/1
20 TABLET, FILM COATED ORAL NIGHTLY
Qty: 30 TABLET | Refills: 0 | Status: SHIPPED | OUTPATIENT
Start: 2023-06-21 | End: 2023-07-14

## 2023-06-21 ASSESSMENT — FIBROSIS 4 INDEX: FIB4 SCORE: 0.6

## 2023-06-21 NOTE — PROGRESS NOTES
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    Current Outpatient Medications Ordered in Epic   Medication Sig Dispense Refill    famotidine (PEPCID) 20 MG Tab Take 1 Tablet by mouth every evening. 30 Tablet 0    Efinaconazole 10 % Solution Apply to affected toenail(s) once daily for 48 weeks; ensure complete coverage of the toenail, the toenail folds, toenail bed, surrounding skin, and the undersurface of the toenail plate 8 mL 11    metFORMIN ER (GLUCOPHAGE XR) 500 MG TABLET SR 24 HR Take 1 Tablet by mouth 2 times a day. 180 Tablet 3    glucose blood (ONETOUCH ULTRA) strip USE ONCE DAILY EARLY MORNING BEFORE FIRST MEAL TO TEST BLOOD SUGAR AS DIRECTED 100 Strip 3    meclizine (ANTIVERT) 12.5 MG Tab Take 1 Tablet by mouth 3 times a day as needed for Dizziness. 30 Tablet 1    OneTouch Delica Lancets 33G Misc USE TO TEST BLOOD SUGAR ONCE DAILY EARLY MORNING BEFORE FIRST MEAL. 100 Each 1    Blood Glucose Test Strips Use one One Touch Ultra 2 strip to test blood sugar once daily early morning before first meal. 100 Strip 0     No current Epic-ordered facility-administered medications on file.       Health Maintenance: acute visit    ROS:  ROS see HPI    Objective:     Exam:  /82   Pulse 93   Temp 36.6 °C (97.9 °F) (Temporal)   Ht 1.702 m (5' 7\")   Wt (!) " 127 kg (280 lb 3.2 oz)   SpO2 96%   BMI 43.89 kg/m²  Body mass index is 43.89 kg/m².    Physical Exam  Vitals reviewed.   Constitutional:       General: He is not in acute distress.     Appearance: Normal appearance. He is obese.   HENT:      Head: Normocephalic and atraumatic.      Right Ear: Tympanic membrane normal.      Left Ear: Tympanic membrane normal.      Ears:      Comments: Mild wax build up     Mouth/Throat:      Mouth: Mucous membranes are moist.      Pharynx: Oropharynx is clear. No oropharyngeal exudate or posterior oropharyngeal erythema.   Cardiovascular:      Rate and Rhythm: Normal rate and regular rhythm.      Heart sounds: Normal heart sounds.   Pulmonary:      Effort: Pulmonary effort is normal. No respiratory distress.      Breath sounds: Normal breath sounds. No wheezing.   Skin:     General: Skin is warm and dry.   Neurological:      Mental Status: He is alert. Mental status is at baseline.      Gait: Gait normal.   Psychiatric:         Mood and Affect: Mood normal.         Behavior: Behavior normal.           Assessment & Plan:     32 y.o. male with the following -     1. Subacute cough  Trial of pepcid and saline nasal sprays to see if that helps with symptoms  - famotidine (PEPCID) 20 MG Tab; Take 1 Tablet by mouth every evening.  Dispense: 30 Tablet; Refill: 0  - DX-CHEST-2 VIEWS; Future  - Quantiferon Gold TB (PPD); Future  - CBC WITH DIFFERENTIAL; Future  - Sed Rate; Future  - CRP QUANTITIVE (NON-CARDIAC); Future  - Comp Metabolic Panel; Future    2. Blood-tinged sputum  - DX-CHEST-2 VIEWS; Future  - Quantiferon Gold TB (PPD); Future  - CBC WITH DIFFERENTIAL; Future  - Sed Rate; Future  - CRP QUANTITIVE (NON-CARDIAC); Future  - Comp Metabolic Panel; Future        Return in about 4 weeks (around 7/19/2023), or if symptoms worsen or fail to improve, for Lab F/U, Medication F/U.    Please note that this dictation was created using voice recognition software. I have made every reasonable  attempt to correct obvious errors, but I expect that there are errors of grammar and possibly content that I did not discover before finalizing the note.

## 2023-06-22 LAB — ERYTHROCYTE [SEDIMENTATION RATE] IN BLOOD BY WESTERGREN METHOD: 7 MM/HOUR (ref 0–20)

## 2023-06-23 LAB
GAMMA INTERFERON BACKGROUND BLD IA-ACNC: 0.08 IU/ML
M TB IFN-G BLD-IMP: NEGATIVE
M TB IFN-G CD4+ BCKGRND COR BLD-ACNC: 0.03 IU/ML
MITOGEN IGNF BCKGRD COR BLD-ACNC: >10 IU/ML
QFT TB2 - NIL TBQ2: 0.04 IU/ML

## 2023-07-14 DIAGNOSIS — R05.2 SUBACUTE COUGH: ICD-10-CM

## 2023-07-14 RX ORDER — FAMOTIDINE 20 MG/1
TABLET, FILM COATED ORAL
Qty: 30 TABLET | Refills: 0 | Status: SHIPPED | OUTPATIENT
Start: 2023-07-14 | End: 2023-07-31

## 2023-07-29 DIAGNOSIS — R05.2 SUBACUTE COUGH: ICD-10-CM

## 2023-07-31 RX ORDER — FAMOTIDINE 20 MG/1
TABLET, FILM COATED ORAL
Qty: 90 TABLET | Refills: 1 | Status: SHIPPED | OUTPATIENT
Start: 2023-07-31

## 2023-08-07 DIAGNOSIS — R74.8 ELEVATED LIVER ENZYMES: ICD-10-CM

## 2023-08-07 DIAGNOSIS — Z11.59 NEED FOR HEPATITIS C SCREENING TEST: ICD-10-CM

## 2023-08-07 DIAGNOSIS — E66.01 CLASS 3 SEVERE OBESITY DUE TO EXCESS CALORIES WITH SERIOUS COMORBIDITY AND BODY MASS INDEX (BMI) OF 40.0 TO 44.9 IN ADULT (HCC): Chronic | ICD-10-CM

## 2023-08-07 DIAGNOSIS — E11.9 TYPE 2 DIABETES MELLITUS WITHOUT COMPLICATION, WITHOUT LONG-TERM CURRENT USE OF INSULIN (HCC): Chronic | ICD-10-CM

## 2023-08-07 DIAGNOSIS — Z13.21 ENCOUNTER FOR VITAMIN DEFICIENCY SCREENING: ICD-10-CM

## 2024-01-01 NOTE — OR SURGEON
Immediate Post OP Note    PreOp Diagnosis: Appendicitis    PostOp Diagnosis: same    Procedure(s):  APPENDECTOMY LAPAROSCOPIC - Wound Class: Clean Contaminated    Surgeon(s):  Rafa Allen M.D.    Anesthesiologist/Type of Anesthesia:  Anesthesiologist: Paulino Gonzalez M.D./General    Surgical Staff:  Assistant: ISAIAS Herrera  Circulator: Ava Rubi R.N.  Scrub Person: Ahsan Vicente    Specimens removed if any:  ID Type Source Tests Collected by Time Destination   A : appendix Tissue Other Tissue HISTOLOGY REQUEST Rafa Allen M.D. 10/20/2018  8:39 AM        Estimated Blood Loss: 10cc    Findings: Inflamed retrocecal appendix removed with hemostasis    Complications: none        10/20/2018 10:48 AM Rafa Allen M.D.    
General

## 2024-06-23 ENCOUNTER — OFFICE VISIT (OUTPATIENT)
Dept: URGENT CARE | Facility: CLINIC | Age: 34
End: 2024-06-23
Payer: COMMERCIAL

## 2024-06-23 ENCOUNTER — TELEPHONE (OUTPATIENT)
Dept: URGENT CARE | Facility: CLINIC | Age: 34
End: 2024-06-23

## 2024-06-23 VITALS
WEIGHT: 274 LBS | BODY MASS INDEX: 44.03 KG/M2 | TEMPERATURE: 99.4 F | HEIGHT: 66 IN | RESPIRATION RATE: 16 BRPM | OXYGEN SATURATION: 94 % | SYSTOLIC BLOOD PRESSURE: 118 MMHG | DIASTOLIC BLOOD PRESSURE: 78 MMHG | HEART RATE: 110 BPM

## 2024-06-23 DIAGNOSIS — J01.00 ACUTE NON-RECURRENT MAXILLARY SINUSITIS: Primary | ICD-10-CM

## 2024-06-23 DIAGNOSIS — J02.9 PHARYNGITIS, UNSPECIFIED ETIOLOGY: ICD-10-CM

## 2024-06-23 LAB — S PYO DNA SPEC NAA+PROBE: NOT DETECTED

## 2024-06-23 PROCEDURE — 99213 OFFICE O/P EST LOW 20 MIN: CPT

## 2024-06-23 PROCEDURE — 87651 STREP A DNA AMP PROBE: CPT

## 2024-06-23 PROCEDURE — 3074F SYST BP LT 130 MM HG: CPT

## 2024-06-23 PROCEDURE — 3078F DIAST BP <80 MM HG: CPT

## 2024-06-23 RX ORDER — DEXAMETHASONE SODIUM PHOSPHATE 10 MG/ML
10 INJECTION INTRAMUSCULAR; INTRAVENOUS ONCE
Status: COMPLETED | OUTPATIENT
Start: 2024-06-23 | End: 2024-06-23

## 2024-06-23 RX ADMIN — DEXAMETHASONE SODIUM PHOSPHATE 10 MG: 10 INJECTION INTRAMUSCULAR; INTRAVENOUS at 09:47

## 2024-06-23 ASSESSMENT — FIBROSIS 4 INDEX: FIB4 SCORE: 0.46

## 2024-06-23 NOTE — LETTER
June 23, 2024    To Whom It May Concern:         This is confirmation that Jevon Carbajal attended his scheduled appointment with RASHI Michel on 6/23/24.   They are medically excused form work due to illness from 06/23/2024 through 06/25/2024. They may return to work on 06/026/2024.           If you have any questions please do not hesitate to call me at the phone number listed below.    Sincerely,          LEXI Michel.  [phone]

## 2024-06-23 NOTE — PROGRESS NOTES
Subjective:   Jevon Carbajal is a 33 y.o. male who presents for Nasal Congestion and Pharyngitis (X 2 days)          I introduced myself to the patient and informed them that I am a Family Nurse Practitioner.    HPI:Jevon is a 33 year-old male  with DM2 who comes in today with c/o sinus pain and pressure, thick green nasal drainage, tactile fever, headache, malaise. Onset was over 3weeks ago.  He also has developed a sore throat in the last couple of days. Patient describes symptoms as constant. They describe the pain as headache, aching and pressure in the area of the sinuses, and sharp and scratchy sore throat. Aggravating factors include leaning forward, blowing their nose exacerbates the sinus pain, eating, drinking, swallowing exacerbates the throat pain. Relieving factors include none. Treatments tried at home include OTC cold and flu medications with no relief. They describe their symptoms as moderate.  Patient states that they have had several bacterial sinus infections in the past and this appears consistent.  States his blood sugars are generally well-controlled, highest reading he has had in the last week is 140.  Per chart review, last A1c on 3/6/2023 was 6.2    Review of Systems   Constitutional:  Positive for chills and malaise/fatigue.   HENT:  Positive for congestion, sinus pain and sore throat. Negative for ear discharge, ear pain and hearing loss.    Eyes:  Negative for blurred vision, double vision, photophobia, pain, discharge and redness.   Respiratory:  Negative for cough, hemoptysis, sputum production, shortness of breath and wheezing.    Cardiovascular:  Negative for chest pain and palpitations.   Gastrointestinal:  Negative for heartburn, nausea and vomiting.   Genitourinary:  Negative for dysuria.   Musculoskeletal:  Negative for myalgias.   Skin:  Negative for rash.   Neurological:  Positive for headaches. Negative for dizziness.   Psychiatric/Behavioral:  Negative for depression.    All  "other systems reviewed and are negative.      Medications: Blood Glucose Test Strips  Efinaconazole Soln  famotidine Tabs  meclizine Tabs  metFORMIN ER Tb24  OneTouch Delica Lancets 33G Misc  OneTouch Ultra Strp     Allergies: Patient has no known allergies.    Problem List: does not have any pertinent problems on file.    Surgical History:  Past Surgical History:   Procedure Laterality Date    APPENDECTOMY LAPAROSCOPIC  10/20/2018    Procedure: APPENDECTOMY LAPAROSCOPIC;  Surgeon: Rafa Allen M.D.;  Location: SURGERY [facility];  Service: General       Past Social Hx:   reports that he has quit smoking. His smoking use included cigarettes. He has never used smokeless tobacco. He reports that he does not drink alcohol and does not use drugs.     Past Family Hx:   family history is not on file.     Problem list, medications, and allergies reviewed by myself today in Epic.   I have documented what I find to be significant in regards to past medical, social, family and surgical history  in my HPI or under PMH/PSH/FH review section, otherwise it is noncontributory     Objective:     /78   Pulse (!) 110   Temp 37.4 °C (99.4 °F) (Temporal)   Resp 16   Ht 1.676 m (5' 6\")   Wt 124 kg (274 lb)   SpO2 94%   BMI 44.22 kg/m²     During this visit, appropriate PPE was worn, and hand hygiene was performed.    Physical Exam  Vitals reviewed.   Constitutional:       General: He is not in acute distress.     Appearance: Normal appearance. He is not ill-appearing, toxic-appearing or diaphoretic.   HENT:      Head: Normocephalic and atraumatic.      Right Ear: Tympanic membrane, ear canal and external ear normal. There is no impacted cerumen.      Left Ear: Tympanic membrane, ear canal and external ear normal. There is no impacted cerumen.      Nose: Congestion and rhinorrhea present. Rhinorrhea is purulent.      Right Turbinates: Swollen.      Left Turbinates: Swollen.      Right Sinus: Maxillary sinus " tenderness present. No frontal sinus tenderness.      Left Sinus: Maxillary sinus tenderness present. No frontal sinus tenderness.      Mouth/Throat:      Mouth: Mucous membranes are moist.      Pharynx: Posterior oropharyngeal erythema present. No oropharyngeal exudate.      Comments: No tonsillar swelling, bilaterally. There is posterior oropharyngeal erythema present, no exudates or cobblestoning.   No soft tissue swelling of the sublingual mucosa, no petechia or swelling of the soft or hard palate, no unilarteral oropharynx swelling, no sign of tonsillar stone, epiglottitis, or abscess.  Airway is patent and there is no stridor.  Patient is managing oral secretions appropriately.  Uvula is midline and appropriate size with no erythema or edema.    Eyes:      General: No scleral icterus.        Right eye: No discharge.         Left eye: No discharge.      Extraocular Movements: Extraocular movements intact.      Conjunctiva/sclera: Conjunctivae normal.      Pupils: Pupils are equal, round, and reactive to light.   Cardiovascular:      Rate and Rhythm: Normal rate and regular rhythm.      Heart sounds: Normal heart sounds.   Pulmonary:      Effort: No respiratory distress.      Breath sounds: Normal breath sounds. No stridor. No wheezing, rhonchi or rales.   Chest:      Chest wall: No tenderness.   Abdominal:      General: There is no distension.      Palpations: Abdomen is soft.   Genitourinary:     Comments: Deferred  Musculoskeletal:         General: Normal range of motion.      Cervical back: Normal range of motion. No rigidity or tenderness.   Lymphadenopathy:      Cervical: No cervical adenopathy.   Skin:     General: Skin is warm and dry.   Neurological:      General: No focal deficit present.      Mental Status: He is alert and oriented to person, place, and time. Mental status is at baseline.   Psychiatric:         Mood and Affect: Mood normal.         Behavior: Behavior normal.             Lab  Results/POC Test Results   Results for orders placed or performed in visit on 06/23/24   POCT CEPHEID GROUP A STREP - PCR   Result Value Ref Range    POC Group A Strep, PCR Not Detected Not Detected, Invalid           Assessment/Plan:     Diagnosis and associated orders:     1. Acute non-recurrent maxillary sinusitis  dexamethasone (Decadron) injection (check route below) 10 mg    amoxicillin-clavulanate (AUGMENTIN) 875-125 MG Tab      2. Pharyngitis, unspecified etiology  dexamethasone (Decadron) injection (check route below) 10 mg    POCT CEPHEID GROUP A STREP - PCR         Comments/MDM:     1. Pharyngitis, unspecified etiology  I discussed with patient I will call them only if PCR testing in clinic today is positive and we need to discuss further treatment otherwise treatment will be the supportive care measures we discussed in clinic today.  Results will be available on [facility] if they have this set up.  They state they understand and are agreeable.  We discussed viral versus bacterial infection, and I informed patient that if the strep PCR is negative then they likely have a self-limiting viral pharyngitis at this time and antibiotics are not an effective treatment for this.    I instructed them that the management for this is supportive care-we discussed cough/deep breathing exercises, drink plenty of fluids, get plenty of rest, try a humidifier in bedroom at night to help keep mucous membranes moist during sleep, gargle with salt water/honey/OTC Cepacol lozenges to help ease sore throat.   I did offer patient a dose of Decadron in clinic today to help relieve inflamed throat tissue, instructed them regarding purpose, side effects, precautions including steroids may lead to an increase in blood sugar, and should monitor blood sugars more closely, avoid concentrated sweets, present to ER if blood sugar is greater than 400 or he is symptomatic with increased thirst, increased urination, abdominal pain, nausea,  vomiting, feeling unwell..  They state they understand, and want to go ahead with the dose.  I did keep them in clinic for 10 minutes after the dose, they tolerated well with no adverse reactions before discharge.    - dexamethasone (Decadron) injection (check route below) 10 mg  - POCT CEPHEID GROUP A STREP - PCR    2. Acute non-recurrent maxillary sinusitis  Patient meets Infect[employer] [place] (IDSA)  guidelines for ABRS given duration of symptoms, worsening severity, clinical history and physical exam   We discussed management of sinus infection including -  - supportive care measures such as drinking plenty of fluids, use a humidifier in room at night to keep mucous membranes moist, applying warm compresses to face and forehead may be useful in relieving sinus pain and pressure, using a sinus wash such as the NeilMed Neti bottle several times a day as needed, Flonase daily, OTC second-generation non-sedating antihistamine such as Zyrtec, Allegra, or Loratadine daily, alternate Tylenol with ibuprofen (unless contraindicated) for pain and fever.  OTC decongestant of choice. Follow manufacturers guidelines for dosing and safety precautions.   -We did discuss red flags and reasons to return to urgent care versus when to go to the ER.    Discussed DDx, management options (risks,benefits, and alternatives to planned treatment), natural progression.  Questions were encouraged and answered. Written information was provided and I did go over this with the patient in clinic today.  Instructed patient regarding red flags and to return to urgent care prn if new or worsening sx or if there is no improvement in condition prn.    Advised the patient to follow-up with the primary care physician for recheck, reevaluation, and consideration of further management.  I did instruct patient regarding medications prescribed, purpose, side effects, cautions.  Instructed patient to get a pharmacy consult when picking up  any prescribed medications.  Strict ER precautions discussed for any mastoid pain, swelling of the face or around the eyes, visual disturbances, eye pain, chest pain, difficulty breathing, difficulty swallowing, wheezing, stridor, or drooling, fever that does not respond to ibuprofen and Tylenol, inability to tolerate fluids, dehydration, especially in the setting of fever, chills, nausea or vomiting, lethargy.     Patient states they have good understanding and they are agreeable with the plan of care.    - dexamethasone (Decadron) injection (check route below) 10 mg  - amoxicillin-clavulanate (AUGMENTIN) 875-125 MG Tab; Take 1 Tablet by mouth 2 times a day for 7 days.  Dispense: 14 Tablet; Refill: 0         Pt is clinically stable at today's acute urgent care visit. Vital signs are normal and reassuring.  No acute distress noted. Appropriate for outpatient management at this time.        I personally reviewed prior external notes and test results pertinent to today's visit.  I have independently reviewed and interpreted all diagnostics ordered during this urgent care acute visit.        Please note that this dictation was created using voice recognition software. I have made a reasonable attempt to correct obvious errors, but I expect that there are errors of grammar and possibly content that I did not discover before finalizing the note.    This note was electronically signed by Landry HERNANDEZ, WILMER, JUAN, VIRIDIANA

## 2024-06-24 RX ORDER — AMOXICILLIN AND CLAVULANATE POTASSIUM 875; 125 MG/1; MG/1
1 TABLET, FILM COATED ORAL 2 TIMES DAILY
Qty: 14 TABLET | Refills: 0 | Status: SHIPPED | OUTPATIENT
Start: 2024-06-24 | End: 2024-07-01

## 2024-06-24 ASSESSMENT — ENCOUNTER SYMPTOMS
HEARTBURN: 0
BLURRED VISION: 0
EYE PAIN: 0
SHORTNESS OF BREATH: 0
DOUBLE VISION: 0
HEADACHES: 1
NAUSEA: 0
HEMOPTYSIS: 0
SINUS PAIN: 1
SPUTUM PRODUCTION: 0
WHEEZING: 0
CHILLS: 1
PALPITATIONS: 0
DEPRESSION: 0
SORE THROAT: 1
DIZZINESS: 0
MYALGIAS: 0
COUGH: 0
PHOTOPHOBIA: 0
EYE DISCHARGE: 0
EYE REDNESS: 0
VOMITING: 0

## (undated) DEVICE — GOWN WARMING STANDARD FLEX - (30/CA)

## (undated) DEVICE — GLOVE BIOGEL INDICATOR SZ 7SURGICAL PF LTX - (50/BX 4BX/CA)

## (undated) DEVICE — TUBE NG SALEM SUMP 16FR (50EA/CA)

## (undated) DEVICE — SUTURE 0 PDS CT-2 (36PK/BX)

## (undated) DEVICE — PACK LAP CHOLE OR - (2EA/CA)

## (undated) DEVICE — LACTATED RINGERS INJ 1000 ML - (14EA/CA 60CA/PF)

## (undated) DEVICE — CANNULA W/SEAL 5X100 Z-THRE - ADED KII (12/BX)

## (undated) DEVICE — DETERGENT RENUZYME PLUS 10 OZ PACKET (50/BX)

## (undated) DEVICE — SET LEADWIRE 5 LEAD BEDSIDE DISPOSABLE ECG (1SET OF 5/EA)

## (undated) DEVICE — SET EXTENSION WITH 2 PORTS (48EA/CA) ***PART #2C8610 IS A SUBSTITUTE*****

## (undated) DEVICE — GLOVE BIOGEL SZ 7 SURGICAL PF LTX - (50PR/BX 4BX/CA)

## (undated) DEVICE — DRAPESURG STERI-DRAPE LONG - (10/BX 4BX/CA)

## (undated) DEVICE — TROCAR 5X100 NON BLADED Z-TH - READ KII (6/BX)

## (undated) DEVICE — SUTURE 4-0 MONOCRYL PLUS PS-2 - 27 INCH (36/BX)

## (undated) DEVICE — BAG RETRIEVAL 10ML (10EA/BX)

## (undated) DEVICE — TUBE E-T HI-LO CUFF 7.0MM (10EA/PK)

## (undated) DEVICE — NEPTUNE 4 PORT MANIFOLD - (20/PK)

## (undated) DEVICE — CHLORAPREP 26 ML APPLICATOR - ORANGE TINT(25/CA)

## (undated) DEVICE — CANISTER SUCTION 3000ML MECHANICAL FILTER AUTO SHUTOFF MEDI-VAC NONSTERILE LF DISP  (40EA/CA)

## (undated) DEVICE — ELECTRODE 850 FOAM ADHESIVE - HYDROGEL RADIOTRNSPRNT (50/PK)

## (undated) DEVICE — ELECTRODE DUAL RETURN W/ CORD - (50/PK)

## (undated) DEVICE — STAPLER 45MM ARTICULATING - ENDO (3EA/BX)

## (undated) DEVICE — TROCAR Z THREAD12MM OPTICAL - NON BLADED (6/BX)

## (undated) DEVICE — MASK ANESTHESIA ADULT  - (100/CA)

## (undated) DEVICE — SODIUM CHL IRRIGATION 0.9% 1000ML (12EA/CA)

## (undated) DEVICE — KIT ANESTHESIA W/CIRCUIT & 3/LT BAG W/FILTER (20EA/CA)

## (undated) DEVICE — TUBING CLEARLINK DUO-VENT - C-FLO (48EA/CA)

## (undated) DEVICE — HEAD HOLDER JUNIOR/ADULT

## (undated) DEVICE — STAPLE 45MM BLUE 4.5MM (12EA/BX)

## (undated) DEVICE — DERMABOND ADVANCED - (12EA/BX)

## (undated) DEVICE — SENSOR SPO2 NEO LNCS ADHESIVE (20/BX) SEE USER NOTES

## (undated) DEVICE — SUTURE GENERAL

## (undated) DEVICE — WATER IRRIG. STER. 1500 ML - (9/CA)

## (undated) DEVICE — BLADE SURGICAL #11 - (50/BX)

## (undated) DEVICE — SEALER ARTICULATING TISSUE NSEAL (6/BX)

## (undated) DEVICE — TUBING INSUFFLATION - (10/BX)

## (undated) DEVICE — KIT ROOM DECONTAMINATION

## (undated) DEVICE — SLEEVE, VASO, THIGH, MED

## (undated) DEVICE — PROTECTOR ULNA NERVE - (36PR/CA)

## (undated) DEVICE — SUCTION INSTRUMENT YANKAUER BULBOUS TIP W/O VENT (50EA/CA)